# Patient Record
Sex: FEMALE | Race: WHITE | NOT HISPANIC OR LATINO
[De-identification: names, ages, dates, MRNs, and addresses within clinical notes are randomized per-mention and may not be internally consistent; named-entity substitution may affect disease eponyms.]

---

## 2017-01-25 ENCOUNTER — APPOINTMENT (OUTPATIENT)
Dept: INFECTIOUS DISEASE | Facility: CLINIC | Age: 57
End: 2017-01-25

## 2017-02-01 ENCOUNTER — APPOINTMENT (OUTPATIENT)
Dept: INFECTIOUS DISEASE | Facility: CLINIC | Age: 57
End: 2017-02-01

## 2017-02-01 ENCOUNTER — OUTPATIENT (OUTPATIENT)
Dept: OUTPATIENT SERVICES | Facility: HOSPITAL | Age: 57
LOS: 1 days | End: 2017-02-01

## 2017-03-08 ENCOUNTER — APPOINTMENT (OUTPATIENT)
Dept: INFECTIOUS DISEASE | Facility: CLINIC | Age: 57
End: 2017-03-08

## 2017-03-08 ENCOUNTER — OUTPATIENT (OUTPATIENT)
Dept: OUTPATIENT SERVICES | Facility: HOSPITAL | Age: 57
LOS: 1 days | End: 2017-03-08

## 2017-03-08 VITALS
RESPIRATION RATE: 14 BRPM | DIASTOLIC BLOOD PRESSURE: 80 MMHG | HEART RATE: 82 BPM | BODY MASS INDEX: 16.48 KG/M2 | OXYGEN SATURATION: 97 % | WEIGHT: 96 LBS | SYSTOLIC BLOOD PRESSURE: 124 MMHG

## 2017-03-08 DIAGNOSIS — J45.42 MODERATE PERSISTENT ASTHMA WITH STATUS ASTHMATICUS: ICD-10-CM

## 2017-03-08 DIAGNOSIS — Z21 ASYMPTOMATIC HUMAN IMMUNODEFICIENCY VIRUS [HIV] INFECTION STATUS: ICD-10-CM

## 2017-03-08 LAB
ALBUMIN SERPL ELPH-MCNC: 3.1 G/DL — LOW (ref 3.4–5)
ALP SERPL-CCNC: 131 U/L — HIGH (ref 40–120)
ALT FLD-CCNC: 18 U/L — SIGNIFICANT CHANGE UP (ref 12–42)
ANION GAP SERPL CALC-SCNC: 6 MMOL/L — LOW (ref 9–16)
AST SERPL-CCNC: 17 U/L — SIGNIFICANT CHANGE UP (ref 15–37)
BASOPHILS NFR BLD AUTO: 0.3 % — SIGNIFICANT CHANGE UP (ref 0–2)
BILIRUB SERPL-MCNC: 0.6 MG/DL — SIGNIFICANT CHANGE UP (ref 0.2–1.2)
BUN SERPL-MCNC: 17 MG/DL — SIGNIFICANT CHANGE UP (ref 7–23)
CALCIUM SERPL-MCNC: 8.1 MG/DL — LOW (ref 8.5–10.5)
CHLORIDE SERPL-SCNC: 107 MMOL/L — SIGNIFICANT CHANGE UP (ref 96–108)
CHOLEST SERPL-MCNC: 147 MG/DL — SIGNIFICANT CHANGE UP
CO2 SERPL-SCNC: 27 MMOL/L — SIGNIFICANT CHANGE UP (ref 22–31)
CREAT SERPL-MCNC: 1.11 MG/DL — SIGNIFICANT CHANGE UP (ref 0.5–1.3)
EOSINOPHIL NFR BLD AUTO: 1.2 % — SIGNIFICANT CHANGE UP (ref 0–6)
GLUCOSE SERPL-MCNC: 77 MG/DL — SIGNIFICANT CHANGE UP (ref 70–99)
HBA1C BLD-MCNC: 5.6 % — SIGNIFICANT CHANGE UP (ref 4.8–5.6)
HCT VFR BLD CALC: 35.4 % — SIGNIFICANT CHANGE UP (ref 34.5–45)
HCV AB S/CO SERPL IA: 13.37 S/CO — SIGNIFICANT CHANGE UP
HCV AB SERPL-IMP: REACTIVE
HDLC SERPL-MCNC: 32 MG/DL — LOW
HGB BLD-MCNC: 11.4 G/DL — LOW (ref 11.5–15.5)
LIPID PNL WITH DIRECT LDL SERPL: 96 MG/DL — SIGNIFICANT CHANGE UP
LYMPHOCYTES # BLD AUTO: 28 % — SIGNIFICANT CHANGE UP (ref 13–44)
MCHC RBC-ENTMCNC: 26.6 PG — LOW (ref 27–34)
MCHC RBC-ENTMCNC: 32.2 G/DL — SIGNIFICANT CHANGE UP (ref 32–36)
MCV RBC AUTO: 82.7 FL — SIGNIFICANT CHANGE UP (ref 80–100)
MONOCYTES NFR BLD AUTO: 6.3 % — SIGNIFICANT CHANGE UP (ref 2–14)
NEUTROPHILS NFR BLD AUTO: 64.2 % — SIGNIFICANT CHANGE UP (ref 43–77)
PLATELET # BLD AUTO: 102 K/UL — LOW (ref 150–400)
POTASSIUM SERPL-MCNC: 4.5 MMOL/L — SIGNIFICANT CHANGE UP (ref 3.5–5.3)
POTASSIUM SERPL-SCNC: 4.5 MMOL/L — SIGNIFICANT CHANGE UP (ref 3.5–5.3)
PROT SERPL-MCNC: 7.9 G/DL — SIGNIFICANT CHANGE UP (ref 6.4–8.2)
RBC # BLD: 4.28 M/UL — SIGNIFICANT CHANGE UP (ref 3.8–5.2)
RBC # FLD: 14.2 % — SIGNIFICANT CHANGE UP (ref 10.3–16.9)
SODIUM SERPL-SCNC: 140 MMOL/L — SIGNIFICANT CHANGE UP (ref 135–145)
TOTAL CHOLESTEROL/HDL RATIO MEASUREMENT: 4.6 RATIO — SIGNIFICANT CHANGE UP
TRIGL SERPL-MCNC: 96 MG/DL — SIGNIFICANT CHANGE UP
TSH SERPL-MCNC: 2.18 UIU/ML — SIGNIFICANT CHANGE UP (ref 0.35–4.94)
WBC # BLD: 3.3 K/UL — LOW (ref 3.8–10.5)
WBC # FLD AUTO: 3.3 K/UL — LOW (ref 3.8–10.5)

## 2017-03-09 LAB — T PALLIDUM AB TITR SER: NEGATIVE — SIGNIFICANT CHANGE UP

## 2017-03-10 LAB
4/8 RATIO: 0.13 RATIO — SIGNIFICANT CHANGE UP (ref 0.9–3.6)
ABS CD8: 856 /UL — HIGH (ref 136–757)
CD3 BLASTS SPEC-ACNC: 88 % — HIGH (ref 59–85)
CD3 BLASTS SPEC-ACNC: 971 /UL — SIGNIFICANT CHANGE UP (ref 799–2171)
CD4 %: 10 % — LOW (ref 36–65)
CD8 %: 78 % — HIGH (ref 11–36)
M TB TUBERC IFN-G BLD QL: -0.01 IU/ML — SIGNIFICANT CHANGE UP
M TB TUBERC IFN-G BLD QL: 0.1 IU/ML — SIGNIFICANT CHANGE UP
M TB TUBERC IFN-G BLD QL: NEGATIVE — SIGNIFICANT CHANGE UP
MITOGEN IGNF BCKGRD COR BLD-ACNC: >10 IU/ML — SIGNIFICANT CHANGE UP
T-CELL CD4 SUBSET PNL BLD: 107 /UL — LOW (ref 489–1457)

## 2017-03-11 LAB
HIV1 RNA # SERPL NAA+PROBE: SIGNIFICANT CHANGE UP
HIV1 RNA SERPL NAA+PROBE-LOG#: 5.02 LG10COP/ML — SIGNIFICANT CHANGE UP

## 2017-03-15 DIAGNOSIS — B20 HUMAN IMMUNODEFICIENCY VIRUS [HIV] DISEASE: ICD-10-CM

## 2017-03-15 DIAGNOSIS — R56.9 UNSPECIFIED CONVULSIONS: ICD-10-CM

## 2017-03-15 DIAGNOSIS — J45.42 MODERATE PERSISTENT ASTHMA WITH STATUS ASTHMATICUS: ICD-10-CM

## 2017-04-17 ENCOUNTER — MEDICATION RENEWAL (OUTPATIENT)
Age: 57
End: 2017-04-17

## 2017-07-10 ENCOUNTER — OUTPATIENT (OUTPATIENT)
Dept: OUTPATIENT SERVICES | Facility: HOSPITAL | Age: 57
LOS: 1 days | End: 2017-07-10

## 2017-07-10 ENCOUNTER — APPOINTMENT (OUTPATIENT)
Dept: INFECTIOUS DISEASE | Facility: CLINIC | Age: 57
End: 2017-07-10

## 2017-07-10 VITALS
OXYGEN SATURATION: 98 % | RESPIRATION RATE: 14 BRPM | HEART RATE: 61 BPM | BODY MASS INDEX: 17.07 KG/M2 | HEIGHT: 64 IN | WEIGHT: 100 LBS

## 2017-07-10 DIAGNOSIS — Z78.9 OTHER SPECIFIED HEALTH STATUS: ICD-10-CM

## 2017-07-10 DIAGNOSIS — Z87.891 PERSONAL HISTORY OF NICOTINE DEPENDENCE: ICD-10-CM

## 2017-07-10 DIAGNOSIS — F17.200 NICOTINE DEPENDENCE, UNSPECIFIED, UNCOMPLICATED: ICD-10-CM

## 2017-07-10 LAB
ALBUMIN SERPL ELPH-MCNC: 3.4 G/DL — SIGNIFICANT CHANGE UP (ref 3.3–5)
ALP SERPL-CCNC: 96 U/L — SIGNIFICANT CHANGE UP (ref 40–120)
ALT FLD-CCNC: 9 U/L — LOW (ref 10–45)
ANION GAP SERPL CALC-SCNC: 13 MMOL/L — SIGNIFICANT CHANGE UP (ref 5–17)
APPEARANCE UR: CLEAR — SIGNIFICANT CHANGE UP
AST SERPL-CCNC: 12 U/L — SIGNIFICANT CHANGE UP (ref 10–40)
BASOPHILS NFR BLD AUTO: 0.3 % — SIGNIFICANT CHANGE UP (ref 0–2)
BILIRUB SERPL-MCNC: 0.2 MG/DL — SIGNIFICANT CHANGE UP (ref 0.2–1.2)
BILIRUB UR-MCNC: NEGATIVE — SIGNIFICANT CHANGE UP
BUN SERPL-MCNC: 22 MG/DL — SIGNIFICANT CHANGE UP (ref 7–23)
CALCIUM SERPL-MCNC: 8.6 MG/DL — SIGNIFICANT CHANGE UP (ref 8.4–10.5)
CHLORIDE SERPL-SCNC: 101 MMOL/L — SIGNIFICANT CHANGE UP (ref 96–108)
CHOLEST SERPL-MCNC: 147 MG/DL — SIGNIFICANT CHANGE UP (ref 10–199)
CO2 SERPL-SCNC: 25 MMOL/L — SIGNIFICANT CHANGE UP (ref 22–31)
COLOR SPEC: YELLOW — SIGNIFICANT CHANGE UP
CREAT SERPL-MCNC: 1 MG/DL — SIGNIFICANT CHANGE UP (ref 0.5–1.3)
DIFF PNL FLD: NEGATIVE — SIGNIFICANT CHANGE UP
EOSINOPHIL NFR BLD AUTO: 2.5 % — SIGNIFICANT CHANGE UP (ref 0–6)
GLUCOSE SERPL-MCNC: 83 MG/DL — SIGNIFICANT CHANGE UP (ref 70–99)
GLUCOSE UR QL: NEGATIVE — SIGNIFICANT CHANGE UP
HCT VFR BLD CALC: 36 % — SIGNIFICANT CHANGE UP (ref 34.5–45)
HCV AB S/CO SERPL IA: 11.66 S/CO — SIGNIFICANT CHANGE UP
HCV AB SERPL-IMP: REACTIVE
HDLC SERPL-MCNC: 30 MG/DL — LOW (ref 40–125)
HGB BLD-MCNC: 11.5 G/DL — SIGNIFICANT CHANGE UP (ref 11.5–15.5)
KETONES UR-MCNC: NEGATIVE — SIGNIFICANT CHANGE UP
LEUKOCYTE ESTERASE UR-ACNC: NEGATIVE — SIGNIFICANT CHANGE UP
LIPID PNL WITH DIRECT LDL SERPL: 84 MG/DL — SIGNIFICANT CHANGE UP
LYMPHOCYTES # BLD AUTO: 30.2 % — SIGNIFICANT CHANGE UP (ref 13–44)
MCHC RBC-ENTMCNC: 26 PG — LOW (ref 27–34)
MCHC RBC-ENTMCNC: 31.9 G/DL — LOW (ref 32–36)
MCV RBC AUTO: 81.3 FL — SIGNIFICANT CHANGE UP (ref 80–100)
MONOCYTES NFR BLD AUTO: 8 % — SIGNIFICANT CHANGE UP (ref 2–14)
NEUTROPHILS NFR BLD AUTO: 59.3 % — SIGNIFICANT CHANGE UP (ref 43–77)
NITRITE UR-MCNC: NEGATIVE — SIGNIFICANT CHANGE UP
PCP SPEC-MCNC: SIGNIFICANT CHANGE UP
PH UR: 6 — SIGNIFICANT CHANGE UP (ref 5–8)
PLATELET # BLD AUTO: 114 K/UL — LOW (ref 150–400)
POTASSIUM SERPL-MCNC: 4.2 MMOL/L — SIGNIFICANT CHANGE UP (ref 3.5–5.3)
POTASSIUM SERPL-SCNC: 4.2 MMOL/L — SIGNIFICANT CHANGE UP (ref 3.5–5.3)
PROT SERPL-MCNC: 7.6 G/DL — SIGNIFICANT CHANGE UP (ref 6–8.3)
PROT UR-MCNC: 30 MG/DL
RBC # BLD: 4.43 M/UL — SIGNIFICANT CHANGE UP (ref 3.8–5.2)
RBC # FLD: 15.7 % — SIGNIFICANT CHANGE UP (ref 10.3–16.9)
SODIUM SERPL-SCNC: 139 MMOL/L — SIGNIFICANT CHANGE UP (ref 135–145)
SP GR SPEC: 1.02 — SIGNIFICANT CHANGE UP (ref 1–1.03)
TOTAL CHOLESTEROL/HDL RATIO MEASUREMENT: 4.9 RATIO — SIGNIFICANT CHANGE UP (ref 3.3–7.1)
TRIGL SERPL-MCNC: 163 MG/DL — HIGH (ref 10–149)
UROBILINOGEN FLD QL: 1 E.U./DL — SIGNIFICANT CHANGE UP
WBC # BLD: 3.2 K/UL — LOW (ref 3.8–10.5)
WBC # FLD AUTO: 3.2 K/UL — LOW (ref 3.8–10.5)

## 2017-07-11 ENCOUNTER — OUTPATIENT (OUTPATIENT)
Dept: OUTPATIENT SERVICES | Facility: HOSPITAL | Age: 57
LOS: 1 days | End: 2017-07-11
Payer: MEDICARE

## 2017-07-11 PROCEDURE — 49406 IMAGE CATH FLUID PERI/RETRO: CPT

## 2017-07-11 PROCEDURE — 62323 NJX INTERLAMINAR LMBR/SAC: CPT

## 2017-07-11 RX ORDER — ALPRAZOLAM 2 MG/1
2 TABLET ORAL
Qty: 90 | Refills: 0 | Status: DISCONTINUED | COMMUNITY
Start: 2016-10-18 | End: 2017-07-11

## 2017-07-11 RX ORDER — OXYCODONE 20 MG/1
20 TABLET ORAL
Qty: 150 | Refills: 0 | Status: DISCONTINUED | COMMUNITY
Start: 2016-11-15 | End: 2017-07-11

## 2017-07-11 RX ORDER — PREGABALIN 25 MG/1
25 CAPSULE ORAL
Qty: 60 | Refills: 0 | Status: DISCONTINUED | COMMUNITY
Start: 2016-10-07 | End: 2017-07-11

## 2017-07-11 RX ORDER — PREGABALIN 50 MG/1
50 CAPSULE ORAL
Qty: 60 | Refills: 0 | Status: DISCONTINUED | COMMUNITY
Start: 2016-11-15 | End: 2017-07-11

## 2017-07-11 RX ORDER — OXYCODONE HYDROCHLORIDE 30 MG/1
30 TABLET ORAL
Qty: 120 | Refills: 0 | Status: DISCONTINUED | COMMUNITY
Start: 2017-01-10 | End: 2017-07-11

## 2017-07-11 RX ORDER — GABAPENTIN 800 MG/1
800 TABLET, COATED ORAL
Qty: 90 | Refills: 0 | Status: DISCONTINUED | COMMUNITY
Start: 2016-09-20 | End: 2017-07-11

## 2017-07-11 RX ORDER — OXYCODONE HYDROCHLORIDE 40 MG/1
40 TABLET, FILM COATED, EXTENDED RELEASE ORAL
Qty: 60 | Refills: 0 | Status: DISCONTINUED | COMMUNITY
Start: 2016-11-15 | End: 2017-07-11

## 2017-07-11 RX ORDER — SULFAMETHOXAZOLE AND TRIMETHOPRIM 800; 160 MG/1; MG/1
800-160 TABLET ORAL
Qty: 90 | Refills: 0 | Status: DISCONTINUED | COMMUNITY
Start: 2016-11-20 | End: 2017-07-11

## 2017-07-12 LAB
4/8 RATIO: 0.1 RATIO — LOW (ref 0.9–3.6)
ABS CD8: 785 /UL — HIGH (ref 136–757)
C TRACH RRNA SPEC QL NAA+PROBE: SIGNIFICANT CHANGE UP
CD3 BLASTS SPEC-ACNC: 876 /UL — SIGNIFICANT CHANGE UP (ref 799–2171)
CD3 BLASTS SPEC-ACNC: 91 % — HIGH (ref 59–85)
CD4 %: 8 % — LOW (ref 36–65)
CD8 %: 81 % — HIGH (ref 11–36)
M TB TUBERC IFN-G BLD QL: 0 IU/ML — SIGNIFICANT CHANGE UP
M TB TUBERC IFN-G BLD QL: 0.03 IU/ML — SIGNIFICANT CHANGE UP
M TB TUBERC IFN-G BLD QL: NEGATIVE — SIGNIFICANT CHANGE UP
MITOGEN IGNF BCKGRD COR BLD-ACNC: 2.12 IU/ML — SIGNIFICANT CHANGE UP
N GONORRHOEA RRNA SPEC QL NAA+PROBE: SIGNIFICANT CHANGE UP
SPECIMEN SOURCE: SIGNIFICANT CHANGE UP
T PALLIDUM AB TITR SER: NEGATIVE — SIGNIFICANT CHANGE UP
T-CELL CD4 SUBSET PNL BLD: 80 /UL — LOW (ref 489–1457)

## 2017-07-13 LAB
HIV-1 VIRAL LOAD RESULT: (no result)
HIV1 RNA # SERPL NAA+PROBE: SIGNIFICANT CHANGE UP
HIV1 RNA SER-IMP: SIGNIFICANT CHANGE UP
HIV1 RNA SERPL NAA+PROBE-ACNC: (no result)
HIV1 RNA SERPL NAA+PROBE-LOG#: 4.87 LG COP/ML — SIGNIFICANT CHANGE UP

## 2017-07-19 DIAGNOSIS — B20 HUMAN IMMUNODEFICIENCY VIRUS [HIV] DISEASE: ICD-10-CM

## 2017-07-19 DIAGNOSIS — J45.909 UNSPECIFIED ASTHMA, UNCOMPLICATED: ICD-10-CM

## 2017-07-19 DIAGNOSIS — R10.13 EPIGASTRIC PAIN: ICD-10-CM

## 2017-07-19 DIAGNOSIS — B19.20 UNSPECIFIED VIRAL HEPATITIS C WITHOUT HEPATIC COMA: ICD-10-CM

## 2017-07-19 DIAGNOSIS — J44.9 CHRONIC OBSTRUCTIVE PULMONARY DISEASE, UNSPECIFIED: ICD-10-CM

## 2017-11-09 ENCOUNTER — EMERGENCY (EMERGENCY)
Facility: HOSPITAL | Age: 57
LOS: 1 days | Discharge: HOME | End: 2017-11-09
Admitting: EMERGENCY MEDICINE
Payer: MEDICARE

## 2017-11-09 VITALS
DIASTOLIC BLOOD PRESSURE: 68 MMHG | OXYGEN SATURATION: 97 % | RESPIRATION RATE: 16 BRPM | TEMPERATURE: 98 F | HEART RATE: 97 BPM | SYSTOLIC BLOOD PRESSURE: 102 MMHG

## 2017-11-09 VITALS
HEART RATE: 103 BPM | RESPIRATION RATE: 17 BRPM | TEMPERATURE: 98 F | OXYGEN SATURATION: 95 % | DIASTOLIC BLOOD PRESSURE: 75 MMHG | SYSTOLIC BLOOD PRESSURE: 106 MMHG

## 2017-11-09 PROCEDURE — 70486 CT MAXILLOFACIAL W/O DYE: CPT | Mod: 26

## 2017-11-09 PROCEDURE — 70450 CT HEAD/BRAIN W/O DYE: CPT | Mod: 26

## 2017-11-09 PROCEDURE — 99285 EMERGENCY DEPT VISIT HI MDM: CPT

## 2017-11-09 PROCEDURE — 73080 X-RAY EXAM OF ELBOW: CPT | Mod: 26,LT

## 2017-11-09 RX ORDER — LEVETIRACETAM 250 MG/1
500 TABLET, FILM COATED ORAL ONCE
Qty: 0 | Refills: 0 | Status: COMPLETED | OUTPATIENT
Start: 2017-11-09 | End: 2017-11-09

## 2017-11-09 RX ORDER — TETANUS TOXOID, REDUCED DIPHTHERIA TOXOID AND ACELLULAR PERTUSSIS VACCINE, ADSORBED 5; 2.5; 8; 8; 2.5 [IU]/.5ML; [IU]/.5ML; UG/.5ML; UG/.5ML; UG/.5ML
0.5 SUSPENSION INTRAMUSCULAR ONCE
Qty: 0 | Refills: 0 | Status: COMPLETED | OUTPATIENT
Start: 2017-11-09 | End: 2017-11-09

## 2017-11-09 RX ORDER — ACETAMINOPHEN 500 MG
650 TABLET ORAL ONCE
Qty: 0 | Refills: 0 | Status: COMPLETED | OUTPATIENT
Start: 2017-11-09 | End: 2017-11-09

## 2017-11-09 RX ADMIN — Medication 650 MILLIGRAM(S): at 14:27

## 2017-11-09 RX ADMIN — TETANUS TOXOID, REDUCED DIPHTHERIA TOXOID AND ACELLULAR PERTUSSIS VACCINE, ADSORBED 0.5 MILLILITER(S): 5; 2.5; 8; 8; 2.5 SUSPENSION INTRAMUSCULAR at 13:04

## 2017-11-09 RX ADMIN — Medication 1 TABLET(S): at 15:56

## 2017-11-09 NOTE — ED ADULT NURSE NOTE - OBJECTIVE STATEMENT
Patient is a 56 y/o female presenting to the ED to be further evaluated after having seizure yesterday after missing keppra dose. Patient would like scan of head. Patient has multiple abrasions to face with ecchymosis. Patient is A,A&Ox3, resting comfortably and will continue to monitor.

## 2017-11-09 NOTE — ED PROVIDER NOTE - HEAD, MLM
Head shape is symmetrical. Swelling and periorbital ecchymosis on right eye. Small abrasion 1cm above right eyebrow. No hematoma over the head.

## 2017-11-09 NOTE — SBIRT NOTE. - NSSBIRTFULLSCREEN_GEN_A_ED_FT
Pt has history of opiates use and is currently on a methadone program. Pt has been trained and Narcan kit was provided.

## 2017-11-09 NOTE — ED PROVIDER NOTE - MUSCULOSKELETAL, MLM
Spine appears normal, range of motion is not limited, no muscle or joint tenderness, no C-spine tenderness, no rib tenderness, Left Elbow: No deformity, no tenderness, FROM, no edema.

## 2017-11-09 NOTE — ED PROVIDER NOTE - MEDICAL DECISION MAKING DETAILS
Pt with history of seizures, on Keppra. Had breakthrough seizure yesterday. Head CT, maxillofacial CT, finger stick, and Tetanus. No clinical indication for labs. Pt has good follow up. Will re-assess. Pt with history of seizures, on Keppra. Had breakthrough seizure yesterday. Head CT, maxillofacial CT, finger stick, and Tetanus. No clinical indication for labs. Pt has good follow up. Will re-assess. pt given strict ED precaution if she develops n/v, and eye drooping. Referral and information given for ENT.

## 2017-11-09 NOTE — ED PROVIDER NOTE - ENMT, MLM
Airway patent, Mouth with normal mucosa. Throat has no vesicles, no oropharyngeal exudates and uvula is midline. Swelling over upper lip and dried blood, dentures, no tongue laceration

## 2017-11-09 NOTE — ED ADULT NURSE NOTE - CHPI ED SYMPTOMS NEG
no weakness/no confusion/no vomiting/no numbness/no loss of consciousness/no nausea/no change in level of consciousness/no dizziness/no fever/no blurred vision

## 2017-11-09 NOTE — ED BEHAVIORAL HEALTH NOTE - BEHAVIORAL HEALTH NOTE
Sw was consulted to the ED to arrange transportation for the patient. Patient is a 57 year old single female who currently lives in a supporting housing community. Transportation was arranged through [employer] ( [facility] [employer] [facility]-[id number] as the patient has medicaid and is covered for non emergency transportation home. Patient is agreeable to transportation home. The vendor that [facility] is providing is [employer] ([phone]) with a  time between 5:15-5:45pm and an invoice number is [id number]. [employer] was contacted but they declined to take the trip. Patient is agreeable to  time and team has been notified on discharge arrangements. Worker made available for any further assistance needed.

## 2017-11-09 NOTE — ED PROVIDER NOTE - OBJECTIVE STATEMENT
58 yo F with a hx of seizures on Keppra 800mg daily, AIDs, on methadone states had a seizure yesterday at 1pm. Last seizure was 1 yr ago. Pt states she is compliant with medication. Came in as per recommendation from EMS because of facial swelling s/p fall. Also notes left elbow pain which may have been due to trauma from seizure. Pt currently denies any HA, N/V, generalized/focal weakness, fever, chills, CP and SOB.

## 2017-11-09 NOTE — ED PROVIDER NOTE - PMH
Asymptomatic human immunodeficiency virus infection  HIV (human immunodeficiency virus infection)  Chronic obstructive pulmonary disease  COPD (chronic obstructive pulmonary disease)  Epilepsy  Seizure disorder  Lumbar disc herniation with radiculopathy    Mixed, or nondependent drug abuse  IV drug abuse  Pneumonia due to methicillin resistant Staphylococcus aureus  MRSA pneumonia  Sciatica

## 2017-11-09 NOTE — ED PROVIDER NOTE - NEUROLOGICAL, MLM
Alert and oriented, no focal deficits, no motor or sensory deficits. Follows commands appropriately. Moves all extremities x4, Strength 5/5 in all extremities.

## 2017-11-10 ENCOUNTER — APPOINTMENT (OUTPATIENT)
Dept: OTOLARYNGOLOGY | Facility: CLINIC | Age: 57
End: 2017-11-10

## 2017-11-13 DIAGNOSIS — M25.422 EFFUSION, LEFT ELBOW: ICD-10-CM
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
## 2017-11-13 DIAGNOSIS — G40.909 EPILEPSY, UNSPECIFIED, NOT INTRACTABLE, WITHOUT STATUS EPILEPTICUS: ICD-10-CM

## 2017-11-13 DIAGNOSIS — Z23 ENCOUNTER FOR IMMUNIZATION: ICD-10-CM

## 2017-11-13 DIAGNOSIS — J44.9 CHRONIC OBSTRUCTIVE PULMONARY DISEASE, UNSPECIFIED: ICD-10-CM
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
## 2017-11-13 DIAGNOSIS — Z79.899 OTHER LONG TERM (CURRENT) DRUG THERAPY: ICD-10-CM
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
## 2017-11-13 DIAGNOSIS — B20 HUMAN IMMUNODEFICIENCY VIRUS [HIV] DISEASE: ICD-10-CM

## 2017-11-13 DIAGNOSIS — S02.80XA FRACTURE OF OTHER SPECIFIED SKULL AND FACIAL BONES, UNSPECIFIED SIDE, INITIAL ENCOUNTER FOR CLOSED FRACTURE: ICD-10-CM

## 2017-11-29 ENCOUNTER — OUTPATIENT (OUTPATIENT)
Dept: OUTPATIENT SERVICES | Facility: HOSPITAL | Age: 57
LOS: 1 days | End: 2017-11-29

## 2017-11-29 ENCOUNTER — APPOINTMENT (OUTPATIENT)
Dept: INFECTIOUS DISEASE | Facility: CLINIC | Age: 57
End: 2017-11-29

## 2017-11-29 VITALS
OXYGEN SATURATION: 97 % | RESPIRATION RATE: 14 BRPM | HEIGHT: 64 IN | HEART RATE: 80 BPM | DIASTOLIC BLOOD PRESSURE: 75 MMHG | SYSTOLIC BLOOD PRESSURE: 115 MMHG | BODY MASS INDEX: 14 KG/M2 | WEIGHT: 82 LBS

## 2017-11-29 DIAGNOSIS — R10.13 EPIGASTRIC PAIN: ICD-10-CM

## 2017-12-11 DIAGNOSIS — R56.9 UNSPECIFIED CONVULSIONS: ICD-10-CM

## 2017-12-11 DIAGNOSIS — B20 HUMAN IMMUNODEFICIENCY VIRUS [HIV] DISEASE: ICD-10-CM

## 2017-12-11 DIAGNOSIS — R10.13 EPIGASTRIC PAIN: ICD-10-CM

## 2017-12-11 DIAGNOSIS — J45.909 UNSPECIFIED ASTHMA, UNCOMPLICATED: ICD-10-CM

## 2017-12-11 DIAGNOSIS — B19.20 UNSPECIFIED VIRAL HEPATITIS C WITHOUT HEPATIC COMA: ICD-10-CM

## 2017-12-21 VITALS
SYSTOLIC BLOOD PRESSURE: 137 MMHG | HEART RATE: 95 BPM | RESPIRATION RATE: 22 BRPM | TEMPERATURE: 98 F | WEIGHT: 72.75 LBS | OXYGEN SATURATION: 95 % | HEIGHT: 64 IN | DIASTOLIC BLOOD PRESSURE: 88 MMHG

## 2017-12-21 PROCEDURE — 71010: CPT | Mod: 26

## 2017-12-21 PROCEDURE — 99285 EMERGENCY DEPT VISIT HI MDM: CPT | Mod: 25

## 2017-12-21 PROCEDURE — 93010 ELECTROCARDIOGRAM REPORT: CPT

## 2017-12-21 NOTE — ED ADULT NURSE NOTE - CHPI ED SYMPTOMS NEG
no tingling/no chills/no dizziness/no weakness/no decreased eating/drinking/no vomiting/no fever/no nausea/no numbness

## 2017-12-21 NOTE — ED ADULT TRIAGE NOTE - CHIEF COMPLAINT QUOTE
Pt with known Hx of HIV x 22 years, presents with c/o SOB, 30 POUND WT LOSS X 1 MONTH. Pt noted for cachexia, wt at triage 33 KG (fully clothed). Pt admits to using street Methadone.

## 2017-12-22 ENCOUNTER — RESULT REVIEW (OUTPATIENT)
Age: 57
End: 2017-12-22

## 2017-12-22 ENCOUNTER — INPATIENT (INPATIENT)
Facility: HOSPITAL | Age: 57
LOS: 3 days | Discharge: HOME | DRG: 391 | End: 2017-12-26
Attending: STUDENT IN AN ORGANIZED HEALTH CARE EDUCATION/TRAINING PROGRAM | Admitting: STUDENT IN AN ORGANIZED HEALTH CARE EDUCATION/TRAINING PROGRAM
Payer: MEDICARE

## 2017-12-22 DIAGNOSIS — B20 HUMAN IMMUNODEFICIENCY VIRUS [HIV] DISEASE: ICD-10-CM

## 2017-12-22 DIAGNOSIS — J44.9 CHRONIC OBSTRUCTIVE PULMONARY DISEASE, UNSPECIFIED: ICD-10-CM

## 2017-12-22 DIAGNOSIS — N17.9 ACUTE KIDNEY FAILURE, UNSPECIFIED: ICD-10-CM

## 2017-12-22 DIAGNOSIS — B19.20 UNSPECIFIED VIRAL HEPATITIS C WITHOUT HEPATIC COMA: ICD-10-CM

## 2017-12-22 DIAGNOSIS — R13.10 DYSPHAGIA, UNSPECIFIED: ICD-10-CM

## 2017-12-22 DIAGNOSIS — B18.2 CHRONIC VIRAL HEPATITIS C: ICD-10-CM

## 2017-12-22 DIAGNOSIS — Z29.9 ENCOUNTER FOR PROPHYLACTIC MEASURES, UNSPECIFIED: ICD-10-CM

## 2017-12-22 DIAGNOSIS — R63.4 ABNORMAL WEIGHT LOSS: ICD-10-CM

## 2017-12-22 DIAGNOSIS — F19.10 OTHER PSYCHOACTIVE SUBSTANCE ABUSE, UNCOMPLICATED: ICD-10-CM

## 2017-12-22 DIAGNOSIS — D61.818 OTHER PANCYTOPENIA: ICD-10-CM

## 2017-12-22 DIAGNOSIS — G40.909 EPILEPSY, UNSPECIFIED, NOT INTRACTABLE, WITHOUT STATUS EPILEPTICUS: ICD-10-CM

## 2017-12-22 LAB
ALBUMIN SERPL ELPH-MCNC: 3.3 G/DL — SIGNIFICANT CHANGE UP (ref 3.3–5)
ALP SERPL-CCNC: 73 U/L — SIGNIFICANT CHANGE UP (ref 40–120)
ALT FLD-CCNC: 10 U/L — SIGNIFICANT CHANGE UP (ref 10–45)
ANION GAP SERPL CALC-SCNC: 11 MMOL/L — SIGNIFICANT CHANGE UP (ref 5–17)
ANION GAP SERPL CALC-SCNC: 15 MMOL/L — SIGNIFICANT CHANGE UP (ref 5–17)
AST SERPL-CCNC: 16 U/L — SIGNIFICANT CHANGE UP (ref 10–40)
BASOPHILS NFR BLD AUTO: 0.3 % — SIGNIFICANT CHANGE UP (ref 0–2)
BASOPHILS NFR BLD AUTO: 0.4 % — SIGNIFICANT CHANGE UP (ref 0–2)
BILIRUB SERPL-MCNC: 0.3 MG/DL — SIGNIFICANT CHANGE UP (ref 0.2–1.2)
BUN SERPL-MCNC: 30 MG/DL — HIGH (ref 7–23)
BUN SERPL-MCNC: 31 MG/DL — HIGH (ref 7–23)
CALCIUM SERPL-MCNC: 8 MG/DL — LOW (ref 8.4–10.5)
CALCIUM SERPL-MCNC: 8.8 MG/DL — SIGNIFICANT CHANGE UP (ref 8.4–10.5)
CHLORIDE SERPL-SCNC: 102 MMOL/L — SIGNIFICANT CHANGE UP (ref 96–108)
CHLORIDE SERPL-SCNC: 104 MMOL/L — SIGNIFICANT CHANGE UP (ref 96–108)
CK MB CFR SERPL CALC: 4.1 NG/ML — SIGNIFICANT CHANGE UP (ref 0–6.7)
CK SERPL-CCNC: 98 U/L — SIGNIFICANT CHANGE UP (ref 25–170)
CO2 SERPL-SCNC: 23 MMOL/L — SIGNIFICANT CHANGE UP (ref 22–31)
CO2 SERPL-SCNC: 23 MMOL/L — SIGNIFICANT CHANGE UP (ref 22–31)
CREAT SERPL-MCNC: 1.12 MG/DL — SIGNIFICANT CHANGE UP (ref 0.5–1.3)
CREAT SERPL-MCNC: 1.19 MG/DL — SIGNIFICANT CHANGE UP (ref 0.5–1.3)
EOSINOPHIL NFR BLD AUTO: 0.6 % — SIGNIFICANT CHANGE UP (ref 0–6)
EOSINOPHIL NFR BLD AUTO: 1.5 % — SIGNIFICANT CHANGE UP (ref 0–6)
GLUCOSE SERPL-MCNC: 115 MG/DL — HIGH (ref 70–99)
GLUCOSE SERPL-MCNC: 88 MG/DL — SIGNIFICANT CHANGE UP (ref 70–99)
HCT VFR BLD CALC: 29.5 % — LOW (ref 34.5–45)
HCT VFR BLD CALC: 31.5 % — LOW (ref 34.5–45)
HGB BLD-MCNC: 10.3 G/DL — LOW (ref 11.5–15.5)
HGB BLD-MCNC: 9.4 G/DL — LOW (ref 11.5–15.5)
LYMPHOCYTES # BLD AUTO: 24.6 % — SIGNIFICANT CHANGE UP (ref 13–44)
LYMPHOCYTES # BLD AUTO: 25 % — SIGNIFICANT CHANGE UP (ref 13–44)
MAGNESIUM SERPL-MCNC: 1.9 MG/DL — SIGNIFICANT CHANGE UP (ref 1.6–2.6)
MCHC RBC-ENTMCNC: 25.9 PG — LOW (ref 27–34)
MCHC RBC-ENTMCNC: 26 PG — LOW (ref 27–34)
MCHC RBC-ENTMCNC: 31.9 G/DL — LOW (ref 32–36)
MCHC RBC-ENTMCNC: 32.7 G/DL — SIGNIFICANT CHANGE UP (ref 32–36)
MCV RBC AUTO: 79.1 FL — LOW (ref 80–100)
MCV RBC AUTO: 81.5 FL — SIGNIFICANT CHANGE UP (ref 80–100)
MONOCYTES NFR BLD AUTO: 5 % — SIGNIFICANT CHANGE UP (ref 2–14)
MONOCYTES NFR BLD AUTO: 5.5 % — SIGNIFICANT CHANGE UP (ref 2–14)
NEUTROPHILS NFR BLD AUTO: 67.6 % — SIGNIFICANT CHANGE UP (ref 43–77)
NEUTROPHILS NFR BLD AUTO: 69.5 % — SIGNIFICANT CHANGE UP (ref 43–77)
NT-PROBNP SERPL-SCNC: 385 PG/ML — HIGH (ref 0–300)
PHOSPHATE SERPL-MCNC: 3.1 MG/DL — SIGNIFICANT CHANGE UP (ref 2.5–4.5)
PLATELET # BLD AUTO: 126 K/UL — LOW (ref 150–400)
PLATELET # BLD AUTO: 139 K/UL — LOW (ref 150–400)
POTASSIUM SERPL-MCNC: 3.7 MMOL/L — SIGNIFICANT CHANGE UP (ref 3.5–5.3)
POTASSIUM SERPL-MCNC: 3.8 MMOL/L — SIGNIFICANT CHANGE UP (ref 3.5–5.3)
POTASSIUM SERPL-SCNC: 3.7 MMOL/L — SIGNIFICANT CHANGE UP (ref 3.5–5.3)
POTASSIUM SERPL-SCNC: 3.8 MMOL/L — SIGNIFICANT CHANGE UP (ref 3.5–5.3)
PROT SERPL-MCNC: 7.7 G/DL — SIGNIFICANT CHANGE UP (ref 6–8.3)
RBC # BLD: 3.62 M/UL — LOW (ref 3.8–5.2)
RBC # BLD: 3.98 M/UL — SIGNIFICANT CHANGE UP (ref 3.8–5.2)
RBC # FLD: 14 % — SIGNIFICANT CHANGE UP (ref 10.3–16.9)
RBC # FLD: 14.1 % — SIGNIFICANT CHANGE UP (ref 10.3–16.9)
SODIUM SERPL-SCNC: 138 MMOL/L — SIGNIFICANT CHANGE UP (ref 135–145)
SODIUM SERPL-SCNC: 140 MMOL/L — SIGNIFICANT CHANGE UP (ref 135–145)
TROPONIN T SERPL-MCNC: <0.01 NG/ML — SIGNIFICANT CHANGE UP (ref 0–0.01)
TSH SERPL-MCNC: 0.46 UIU/ML — SIGNIFICANT CHANGE UP (ref 0.35–4.94)
WBC # BLD: 2.7 K/UL — LOW (ref 3.8–10.5)
WBC # BLD: 3.4 K/UL — LOW (ref 3.8–10.5)
WBC # FLD AUTO: 2.7 K/UL — LOW (ref 3.8–10.5)
WBC # FLD AUTO: 3.4 K/UL — LOW (ref 3.8–10.5)

## 2017-12-22 PROCEDURE — 99223 1ST HOSP IP/OBS HIGH 75: CPT | Mod: GC

## 2017-12-22 PROCEDURE — 71010: CPT | Mod: 26

## 2017-12-22 RX ORDER — EMTRICITABINE AND TENOFOVIR DISOPROXIL FUMARATE 200; 300 MG/1; MG/1
1 TABLET, FILM COATED ORAL
Qty: 0 | Refills: 0 | Status: DISCONTINUED | OUTPATIENT
Start: 2017-12-24 | End: 2017-12-26

## 2017-12-22 RX ORDER — PANTOPRAZOLE SODIUM 20 MG/1
40 TABLET, DELAYED RELEASE ORAL DAILY
Qty: 0 | Refills: 0 | Status: DISCONTINUED | OUTPATIENT
Start: 2017-12-22 | End: 2017-12-25

## 2017-12-22 RX ORDER — MAGNESIUM SULFATE 500 MG/ML
1 VIAL (ML) INJECTION ONCE
Qty: 0 | Refills: 0 | Status: COMPLETED | OUTPATIENT
Start: 2017-12-22 | End: 2017-12-22

## 2017-12-22 RX ORDER — FLUCONAZOLE 150 MG/1
100 TABLET ORAL DAILY
Qty: 0 | Refills: 0 | Status: DISCONTINUED | OUTPATIENT
Start: 2017-12-23 | End: 2017-12-23

## 2017-12-22 RX ORDER — BUDESONIDE AND FORMOTEROL FUMARATE DIHYDRATE 160; 4.5 UG/1; UG/1
2 AEROSOL RESPIRATORY (INHALATION)
Qty: 0 | Refills: 0 | Status: DISCONTINUED | OUTPATIENT
Start: 2017-12-22 | End: 2017-12-26

## 2017-12-22 RX ORDER — HEPARIN SODIUM 5000 [USP'U]/ML
5000 INJECTION INTRAVENOUS; SUBCUTANEOUS EVERY 12 HOURS
Qty: 0 | Refills: 0 | Status: DISCONTINUED | OUTPATIENT
Start: 2017-12-22 | End: 2017-12-26

## 2017-12-22 RX ORDER — EMTRICITABINE AND TENOFOVIR DISOPROXIL FUMARATE 200; 300 MG/1; MG/1
1 TABLET, FILM COATED ORAL DAILY
Qty: 0 | Refills: 0 | Status: DISCONTINUED | OUTPATIENT
Start: 2017-12-22 | End: 2017-12-22

## 2017-12-22 RX ORDER — RITONAVIR 100 MG/1
100 TABLET, FILM COATED ORAL DAILY
Qty: 0 | Refills: 0 | Status: DISCONTINUED | OUTPATIENT
Start: 2017-12-22 | End: 2017-12-23

## 2017-12-22 RX ORDER — ACETAMINOPHEN 500 MG
500 TABLET ORAL EVERY 6 HOURS
Qty: 0 | Refills: 0 | Status: DISCONTINUED | OUTPATIENT
Start: 2017-12-22 | End: 2017-12-23

## 2017-12-22 RX ORDER — SODIUM CHLORIDE 9 MG/ML
1000 INJECTION, SOLUTION INTRAVENOUS
Qty: 0 | Refills: 0 | Status: DISCONTINUED | OUTPATIENT
Start: 2017-12-22 | End: 2017-12-26

## 2017-12-22 RX ORDER — HYDROMORPHONE HYDROCHLORIDE 2 MG/ML
0.5 INJECTION INTRAMUSCULAR; INTRAVENOUS; SUBCUTANEOUS
Qty: 0 | Refills: 0 | Status: DISCONTINUED | OUTPATIENT
Start: 2017-12-22 | End: 2017-12-26

## 2017-12-22 RX ORDER — FLUTICASONE PROPIONATE AND SALMETEROL 50; 250 UG/1; UG/1
2 POWDER ORAL; RESPIRATORY (INHALATION)
Qty: 0 | Refills: 0 | COMMUNITY

## 2017-12-22 RX ORDER — ALBUTEROL 90 UG/1
2 AEROSOL, METERED ORAL
Qty: 0 | Refills: 0 | COMMUNITY

## 2017-12-22 RX ORDER — PANTOPRAZOLE SODIUM 20 MG/1
40 TABLET, DELAYED RELEASE ORAL ONCE
Qty: 0 | Refills: 0 | Status: COMPLETED | OUTPATIENT
Start: 2017-12-22 | End: 2017-12-22

## 2017-12-22 RX ORDER — ACETAMINOPHEN 500 MG
750 TABLET ORAL EVERY 8 HOURS
Qty: 0 | Refills: 0 | Status: DISCONTINUED | OUTPATIENT
Start: 2017-12-22 | End: 2017-12-22

## 2017-12-22 RX ORDER — BENZOCAINE AND MENTHOL 5; 1 G/100ML; G/100ML
1 LIQUID ORAL
Qty: 0 | Refills: 0 | Status: DISCONTINUED | OUTPATIENT
Start: 2017-12-22 | End: 2017-12-23

## 2017-12-22 RX ORDER — ATAZANAVIR 200 MG/1
300 CAPSULE ORAL DAILY
Qty: 0 | Refills: 0 | Status: DISCONTINUED | OUTPATIENT
Start: 2017-12-22 | End: 2017-12-26

## 2017-12-22 RX ORDER — GANCICLOVIR SODIUM 50 MG/ML
80 INJECTION, POWDER, LYOPHILIZED, FOR SOLUTION INTRAVENTRICULAR EVERY 24 HOURS
Qty: 0 | Refills: 0 | Status: DISCONTINUED | OUTPATIENT
Start: 2017-12-23 | End: 2017-12-23

## 2017-12-22 RX ORDER — GANCICLOVIR SODIUM 50 MG/ML
INJECTION, POWDER, LYOPHILIZED, FOR SOLUTION INTRAVENTRICULAR
Qty: 0 | Refills: 0 | Status: DISCONTINUED | OUTPATIENT
Start: 2017-12-22 | End: 2017-12-23

## 2017-12-22 RX ORDER — ALBUTEROL 90 UG/1
1 AEROSOL, METERED ORAL
Qty: 0 | Refills: 0 | Status: DISCONTINUED | OUTPATIENT
Start: 2017-12-22 | End: 2017-12-26

## 2017-12-22 RX ORDER — AZITHROMYCIN 500 MG/1
500 TABLET, FILM COATED ORAL ONCE
Qty: 0 | Refills: 0 | Status: COMPLETED | OUTPATIENT
Start: 2017-12-22 | End: 2017-12-22

## 2017-12-22 RX ORDER — GANCICLOVIR SODIUM 50 MG/ML
80 INJECTION, POWDER, LYOPHILIZED, FOR SOLUTION INTRAVENTRICULAR ONCE
Qty: 0 | Refills: 0 | Status: COMPLETED | OUTPATIENT
Start: 2017-12-22 | End: 2017-12-22

## 2017-12-22 RX ORDER — LEVETIRACETAM 250 MG/1
1 TABLET, FILM COATED ORAL
Qty: 0 | Refills: 0 | COMMUNITY

## 2017-12-22 RX ORDER — ACETAMINOPHEN 500 MG
500 TABLET ORAL ONCE
Qty: 0 | Refills: 0 | Status: DISCONTINUED | OUTPATIENT
Start: 2017-12-22 | End: 2017-12-22

## 2017-12-22 RX ORDER — FAMOTIDINE 10 MG/ML
20 INJECTION INTRAVENOUS
Qty: 0 | Refills: 0 | Status: DISCONTINUED | OUTPATIENT
Start: 2017-12-22 | End: 2017-12-22

## 2017-12-22 RX ORDER — LEVETIRACETAM 250 MG/1
500 TABLET, FILM COATED ORAL
Qty: 0 | Refills: 0 | Status: DISCONTINUED | OUTPATIENT
Start: 2017-12-22 | End: 2017-12-23

## 2017-12-22 RX ORDER — ACETAMINOPHEN 500 MG
500 TABLET ORAL ONCE
Qty: 0 | Refills: 0 | Status: COMPLETED | OUTPATIENT
Start: 2017-12-22 | End: 2017-12-22

## 2017-12-22 RX ORDER — FLUCONAZOLE 150 MG/1
200 TABLET ORAL ONCE
Qty: 0 | Refills: 0 | Status: COMPLETED | OUTPATIENT
Start: 2017-12-22 | End: 2017-12-22

## 2017-12-22 RX ORDER — GANCICLOVIR SODIUM 50 MG/ML
INJECTION, POWDER, LYOPHILIZED, FOR SOLUTION INTRAVENTRICULAR
Qty: 0 | Refills: 0 | Status: DISCONTINUED | OUTPATIENT
Start: 2017-12-22 | End: 2017-12-22

## 2017-12-22 RX ORDER — SODIUM CHLORIDE 9 MG/ML
1000 INJECTION INTRAMUSCULAR; INTRAVENOUS; SUBCUTANEOUS ONCE
Qty: 0 | Refills: 0 | Status: COMPLETED | OUTPATIENT
Start: 2017-12-22 | End: 2017-12-22

## 2017-12-22 RX ORDER — CEFTRIAXONE 500 MG/1
1 INJECTION, POWDER, FOR SOLUTION INTRAMUSCULAR; INTRAVENOUS ONCE
Qty: 0 | Refills: 0 | Status: COMPLETED | OUTPATIENT
Start: 2017-12-22 | End: 2017-12-22

## 2017-12-22 RX ORDER — SUCRALFATE 1 G
1 TABLET ORAL
Qty: 0 | Refills: 0 | Status: DISCONTINUED | OUTPATIENT
Start: 2017-12-22 | End: 2017-12-26

## 2017-12-22 RX ORDER — POTASSIUM CHLORIDE 20 MEQ
10 PACKET (EA) ORAL
Qty: 0 | Refills: 0 | Status: COMPLETED | OUTPATIENT
Start: 2017-12-22 | End: 2017-12-22

## 2017-12-22 RX ADMIN — SODIUM CHLORIDE 50 MILLILITER(S): 9 INJECTION, SOLUTION INTRAVENOUS at 12:22

## 2017-12-22 RX ADMIN — PANTOPRAZOLE SODIUM 40 MILLIGRAM(S): 20 TABLET, DELAYED RELEASE ORAL at 11:50

## 2017-12-22 RX ADMIN — Medication 500 MILLIGRAM(S): at 21:54

## 2017-12-22 RX ADMIN — FLUCONAZOLE 100 MILLIGRAM(S): 150 TABLET ORAL at 04:28

## 2017-12-22 RX ADMIN — ALBUTEROL 1 PUFF(S): 90 AEROSOL, METERED ORAL at 11:01

## 2017-12-22 RX ADMIN — LEVETIRACETAM 500 MILLIGRAM(S): 250 TABLET, FILM COATED ORAL at 06:26

## 2017-12-22 RX ADMIN — Medication 100 GRAM(S): at 08:47

## 2017-12-22 RX ADMIN — BUDESONIDE AND FORMOTEROL FUMARATE DIHYDRATE 2 PUFF(S): 160; 4.5 AEROSOL RESPIRATORY (INHALATION) at 20:09

## 2017-12-22 RX ADMIN — AZITHROMYCIN 255 MILLIGRAM(S): 500 TABLET, FILM COATED ORAL at 03:27

## 2017-12-22 RX ADMIN — GANCICLOVIR SODIUM 100 MILLIGRAM(S): 50 INJECTION, POWDER, LYOPHILIZED, FOR SOLUTION INTRAVENTRICULAR at 21:54

## 2017-12-22 RX ADMIN — ALBUTEROL 1 PUFF(S): 90 AEROSOL, METERED ORAL at 23:46

## 2017-12-22 RX ADMIN — PANTOPRAZOLE SODIUM 40 MILLIGRAM(S): 20 TABLET, DELAYED RELEASE ORAL at 01:15

## 2017-12-22 RX ADMIN — Medication 500 MILLIGRAM(S): at 09:42

## 2017-12-22 RX ADMIN — Medication 10 MILLILITER(S): at 11:50

## 2017-12-22 RX ADMIN — ATAZANAVIR 300 MILLIGRAM(S): 200 CAPSULE ORAL at 11:50

## 2017-12-22 RX ADMIN — EMTRICITABINE AND TENOFOVIR DISOPROXIL FUMARATE 1 TABLET(S): 200; 300 TABLET, FILM COATED ORAL at 11:50

## 2017-12-22 RX ADMIN — SODIUM CHLORIDE 500 MILLILITER(S): 9 INJECTION INTRAMUSCULAR; INTRAVENOUS; SUBCUTANEOUS at 00:55

## 2017-12-22 RX ADMIN — Medication 200 MILLIGRAM(S): at 09:27

## 2017-12-22 RX ADMIN — BUDESONIDE AND FORMOTEROL FUMARATE DIHYDRATE 2 PUFF(S): 160; 4.5 AEROSOL RESPIRATORY (INHALATION) at 09:24

## 2017-12-22 RX ADMIN — Medication 500 MILLIGRAM(S): at 22:30

## 2017-12-22 RX ADMIN — Medication 100 MILLIEQUIVALENT(S): at 08:47

## 2017-12-22 RX ADMIN — CEFTRIAXONE 100 GRAM(S): 500 INJECTION, POWDER, FOR SOLUTION INTRAMUSCULAR; INTRAVENOUS at 02:51

## 2017-12-22 RX ADMIN — ALBUTEROL 1 PUFF(S): 90 AEROSOL, METERED ORAL at 20:14

## 2017-12-22 RX ADMIN — RITONAVIR 100 MILLIGRAM(S): 100 TABLET, FILM COATED ORAL at 11:50

## 2017-12-22 RX ADMIN — Medication 1 GRAM(S): at 23:44

## 2017-12-22 RX ADMIN — FAMOTIDINE 20 MILLIGRAM(S): 10 INJECTION INTRAVENOUS at 06:27

## 2017-12-22 RX ADMIN — LEVETIRACETAM 500 MILLIGRAM(S): 250 TABLET, FILM COATED ORAL at 20:09

## 2017-12-22 RX ADMIN — HEPARIN SODIUM 5000 UNIT(S): 5000 INJECTION INTRAVENOUS; SUBCUTANEOUS at 06:27

## 2017-12-22 NOTE — PROGRESS NOTE ADULT - SUBJECTIVE AND OBJECTIVE BOX
Patient is a 57y old  Female who presents with a chief complaint of odynophagia (22 Dec 2017 02:18)      INTERVAL HPI/OVERNIGHT EVENTS:    Pt. seen and examined at 3PM  Pt. c/o dysphagia and odynophagia; weight loss  No F/C, CP, SOB, N/V/D    Review of Systems: 12 point review of systems otherwise negative    MEDICATIONS  (STANDING):  ALBUTerol    90 MICROgram(s) HFA Inhaler 1 Puff(s) Inhalation four times a day  atazanavir 300 milliGRAM(s) Oral daily  buDESOnide 160 MICROgram(s)/formoterol 4.5 MICROgram(s) Inhaler 2 Puff(s) Inhalation two times a day  dextrose 5% + sodium chloride 0.9%. 1000 milliLiter(s) (50 mL/Hr) IV Continuous <Continuous>  heparin  Injectable 5000 Unit(s) SubCutaneous every 12 hours  levETIRAcetam 500 milliGRAM(s) Oral two times a day  pantoprazole  Injectable 40 milliGRAM(s) IV Push daily  ritonavir Tablet 100 milliGRAM(s) Oral daily  trimethoprim  40 mG/sulfamethoxazole 200 mG Suspension 10 milliLiter(s) Oral daily    MEDICATIONS  (PRN):  acetaminophen    Suspension. 500 milliGRAM(s) Oral every 6 hours PRN Moderate Pain (4 - 6)  benzocaine 15 mG/menthol 3.6 mG Lozenge 1 Lozenge Oral every 3 hours PRN Sore Throat      Allergies    No Known Allergies    Intolerances          Vital Signs Last 24 Hrs  T(C): 36.2 (22 Dec 2017 14:41), Max: 37.1 (22 Dec 2017 06:35)  T(F): 97.2 (22 Dec 2017 14:41), Max: 98.7 (22 Dec 2017 06:35)  HR: 68 (22 Dec 2017 14:41) (60 - 95)  BP: 125/77 (22 Dec 2017 14:41) (112/71 - 151/84)  BP(mean): --  RR: 18 (22 Dec 2017 14:41) (18 - 22)  SpO2: 98% (22 Dec 2017 14:41) (95% - 99%)  CAPILLARY BLOOD GLUCOSE            Physical Exam:  (at 3PM)  Daily Height in cm: 162.56 (22 Dec 2017 14:41)    Daily   General:  cachectic-appearing  HEENT:  dry MM, no visible thrush, +hoarse voice  Abdomen:  soft NT ND  Extremities:  no edema B/L LE  Skin:  WWP  Neuro:  AAOx3    LABS:                        9.4    2.7   )-----------( 126      ( 22 Dec 2017 05:45 )             29.5     12-22    138  |  104  |  30<H>  ----------------------------<  88  3.7   |  23  |  1.12    Ca    8.0<L>      22 Dec 2017 05:43  Phos  3.1     12-22  Mg     1.9     12-22    TPro  7.7  /  Alb  3.3  /  TBili  0.3  /  DBili  x   /  AST  16  /  ALT  10  /  AlkPhos  73  12-22            RADIOLOGY & ADDITIONAL TESTS:    ---------------------------------------------------------------------------  I personally reviewed: [  ]EKG   [  ]CXR    [  ] CT    [  ]Other  ---------------------------------------------------------------------------  PLEASE CHECK WHEN PRESENT:     [  ]Heart Failure     [  ] Acute     [  ] Acute on Chronic     [  ] Chronic  -------------------------------------------------------------------     [  ]Diastolic [HFpEF]     [  ]Systolic [HFrEF]     [  ]Combined [HFpEF & HFrEF]     [  ]Other:  -------------------------------------------------------------------  [  ]SEVERINO     [  ]ATN     [  ]Reneal Medullary Necrosis     [  ]Renal Cortical Necrosis     [  ]Other Pathological Lesions:    [  ]CKD 1  [  ]CKD 2  [  ]CKD 3  [  ]CKD 4  [  ]CKD 5  [  ]Other  -------------------------------------------------------------------  [  ]Other/Unspecified:    --------------------------------------------------------------------    Abdominal Nutritional Status  [  ]Malnutrition: See Nutrition Note  [  ]Cachexia  [  ]Other:   [  ]Supplement Ordered:  [  ]Morbid Obesity (BMI >=40]

## 2017-12-22 NOTE — ED PROVIDER NOTE - DIAGNOSTIC INTERPRETATION
ER Physician: Amilcar Pinon  CHEST XRAY INTERPRETATION: RLL opacity, heart shadow normal, bony structures intact

## 2017-12-22 NOTE — CONSULT NOTE ADULT - ASSESSMENT
57F w/ HIV/AIDS (non-adherent to HAART, 7/2017 CD4: 80, VL: 74,424) presents to hospital w/ odynophagia w/ concern for esophagitis

## 2017-12-22 NOTE — ED PROVIDER NOTE - MEDICAL DECISION MAKING DETAILS
weight loss (reviewed emr from Nov, noted weight 45kg, and now 30s), w/ CD4 80, and currently has not taken HIV meds, possibly opportunistic infection/candidiasis, hydrate, ID consult, admission, nutrition consult as well.  pt afebrile, no hypoxia, lungs clear on exam, low suspicion for PCP

## 2017-12-22 NOTE — H&P ADULT - NSHPREVIEWOFSYSTEMS_GEN_ALL_CORE
CONSTITUTIONAL: +weakness, no fevers or chills  EYES/ENT: No visual changes;  No vertigo. +throat pain   NECK: No pain or stiffness  RESPIRATORY: No cough, wheezing, hemoptysis; No shortness of breath  CARDIOVASCULAR: No chest pain or palpitations  GASTROINTESTINAL: No abdominal or epigastric pain. No nausea, vomiting, or hematemesis; No diarrhea or constipation. No melena or hematochezia.  GENITOURINARY: No dysuria, frequency or hematuria  NEUROLOGICAL: No numbness or weakness  SKIN: No itching, burning, rashes, or lesions   All other review of systems is negative unless indicated above.

## 2017-12-22 NOTE — H&P ADULT - HISTORY OF PRESENT ILLNESS
Patient is a 57 year old female with a PMHx of HIV (on HAART but questionable compliance, 7/2017 CD4: 80, VL: 74,424), asthma, HepC, GERD and a seizure disorder (last seizure last month) Patient is a 57 year old female with a PMHx of HIV (on HAART but questionable compliance, 7/2017 CD4: 80, VL: 74,424), asthma, IVDA (used heroin yesterday), HepC, GERD and a seizure disorder (last seizure last month) who presents with two weeks of odynophagia. The patient said the symptoms got progressively worse and she can no longer swallow solids or liquids. She feels like something is stuck in her throat and she has to cough it up but is unable to. Because of these symptoms, she hasn't been able to take any of her medications. The patient also reports weight loss but cannot quantify how much. She has been feeling weak and reported falling in the street earlier last week. She is known to Dr Cornelius, who prescribed her PPIs. ROS negative for fever, chills, abdominal pain, diarrhea, cough, chest pain or palpitations.    In the ED VS were T 98.2 HR 95 /88 RR 22 O2 95 on RA. She was given cef, azithro, protonix and IVF.

## 2017-12-22 NOTE — CONSULT NOTE ADULT - ASSESSMENT
57yr old F with a PMHx of HCV (unsure if Rx), GERD, HIV/AIDs (CD4: 80, VL: 74,424 - 07/2017, on HAART with questionable compliance), Asthma, IVDA (used heroin yesterday), seizure disorder (last seizure last month) who presents for evaluation of difficulty swallowing.    # Dysphagia/Odynophagia -   - ddx - infectious (fungal, viral), esophagitis, esophageal stricture, malignancy   - continue PPI  - since she just completed a course of antifungals - per patient and no response might need evaluation to r/o viral esophageal ulcers  - NPO for now  - will plan for EGD as schedule permits    Will discuss with attending Dr Bronson BROOKS following

## 2017-12-22 NOTE — ED PROVIDER NOTE - PHYSICAL EXAMINATION
CON: ao x 3, cachectic, HENMT: clear oropharynx, soft neck, no obvious oropharyngeal thrush noted, HEAD: atraumatic, CV: rrr, equal pulses b/l, RESP: cta b/l, GI: +BS, soft, nontender, no rebound, no guarding, SKIN: no rash, noted dry skin and tenting, MSK: no deformities

## 2017-12-22 NOTE — CONSULT NOTE ADULT - SUBJECTIVE AND OBJECTIVE BOX
HPI:  57yr old F with a PMHx of HCV (unsure if Rx), GERD, HIV/AIDs (CD4: 80, VL: 74,424 - 07/2017, on HAART with questionable compliance), Asthma, IVDA (used heroin yesterday), seizure disorder (last seizure last month) who presents for evaluation of difficulty swallowing.  According to patient for the last 2 weeks she has had difficulty and significant pain when swallowing both solids and liquids, feels that it gets hung up in her upper throat and mid sternum, not sure which consistency started giving problems first, associated with nausea, and intermittent emesis (post prandial, bilious sometimes, no blood, no coffee grounds), weight loss (reports a 30lbs weight loss in 2 weeks), epigastric abdominal discomfort, weakness, and inability to take her medications. She went to see her PCP Dr Cornelius whom patient states told her she had oral thrush, and started her on an antifungal that she just completed a few days ago. Patient reports that the pain/difficulty swallowing has not improved since completing the course of antifungal.  She denies fever, chills, bleeding (melena, BRBPR), constipation/diarrhea, abdominal pain, sick contacts, recent travel, dysuria, leg swelling.    EGD - never  Colonoscopy - never      PAST MEDICAL & SURGICAL HISTORY:  Sciatica  Lumbar disc herniation with radiculopathy  Epilepsy: Seizure disorder  Chronic obstructive pulmonary disease: COPD (chronic obstructive pulmonary disease)  Asymptomatic human immunodeficiency virus infection: HIV (human immunodeficiency virus infection)  Pneumonia due to methicillin resistant Staphylococcus aureus: MRSA pneumonia  Mixed, or nondependent drug abuse: IV drug abuse  Other postprocedural status: left hand abscess      REVIEW OF SYSTEMS  Apart from items noted in the HPI a 10point ROS was negative  	    MEDICATIONS  (STANDING):  ALBUTerol    90 MICROgram(s) HFA Inhaler 1 Puff(s) Inhalation four times a day  atazanavir 300 milliGRAM(s) Oral daily  buDESOnide 160 MICROgram(s)/formoterol 4.5 MICROgram(s) Inhaler 2 Puff(s) Inhalation two times a day  emtricitabine 200 mG/tenofovir 300 mG (TRUVADA) 1 Tablet(s) Oral daily  heparin  Injectable 5000 Unit(s) SubCutaneous every 12 hours  levETIRAcetam 500 milliGRAM(s) Oral two times a day  pantoprazole  Injectable 40 milliGRAM(s) IV Push daily  potassium chloride  10 mEq/100 mL IVPB 10 milliEquivalent(s) IV Intermittent every 1 hour  ritonavir Tablet 100 milliGRAM(s) Oral daily  trimethoprim  40 mG/sulfamethoxazole 200 mG Suspension 10 milliLiter(s) Oral daily    MEDICATIONS  (PRN):    Allergies  No Known Allergies    Intolerances    SOCIAL HISTORY:  Uses IV heroin - last use a few days ago, denies tobacco or alcohol use    FAMILY HISTORY:  denies FHx of stomach/colon/pancreatic cancer or liver problems    Vital Signs Last 24 Hrs  T(C): 36.6 (22 Dec 2017 08:14), Max: 37.1 (22 Dec 2017 06:35)  T(F): 97.9 (22 Dec 2017 08:14), Max: 98.7 (22 Dec 2017 06:35)  HR: 60 (22 Dec 2017 08:14) (60 - 95)  BP: 128/77 (22 Dec 2017 08:14) (112/71 - 151/84)  BP(mean): --  RR: 18 (22 Dec 2017 08:14) (18 - 22)  SpO2: 99% (22 Dec 2017 08:14) (95% - 99%)    PHYSICAL EXAM:  GEN - middle aged F looking older than stated age, lying in bed in no distress, anicteric, afebrile, not pale  ENMT: no oral thrush appreciated  Respiratory: good air entry  Cardiovascular: s1 s2 no M   Gastrointestinal: full, osft, BS+, NT, NR, NG, no masses or organs palpated  Extremities: no edema  Neurological: AAOx3 non focal      LABS:               9.4    2.7   )-----------( 126      ( 22 Dec 2017 05:45 )             29.5     138  |  104  |  30<H>  ----------------------------<  88  3.7   |  23  |  1.12    Ca    8.0<L>      22 Dec 2017 05:43  Phos  3.1     12-22  Mg     1.9     12-22    TPro  7.7  /  Alb  3.3  /  TBili  0.3  /  DBili  x   /  AST  16  /  ALT  10  /  AlkPhos  73  12-22        RADIOLOGY & ADDITIONAL STUDIES:

## 2017-12-22 NOTE — H&P ADULT - ASSESSMENT
Patient is a 57 year old female with a PMHx of HIV (on HAART but questionable compliance, 7/2017 CD4: 80, VL: 74,424), asthma, IVDA (used heroin yesterday), HepC, GERD and a seizure disorder (last seizure last month) who presents with odynophagia and weight loss

## 2017-12-22 NOTE — ED PROVIDER NOTE - PROGRESS NOTE DETAILS
xray reviewed, not c/w PCP xray reviewed, noted opacity to RLL, pt reported cough and sob, xray not c/w PCP pna findings, will give dose of abx to start

## 2017-12-22 NOTE — ED PROVIDER NOTE - OBJECTIVE STATEMENT
57 yof pw weight loss, states 30lb in the last several weeks, as she's having pain w/ swallowing.  pain to lower throat and sternum.  seen by PMD, given prilosec, w/o relief.  states only taking small sips of water.  no exertional cp or sob.  hx of HIV, reports compliance w/ meds but unable to take for the last 2-3 weeks w/ sx. 57 yof pw weight loss, states 30lb in the last several weeks, as she's having pain w/ swallowing.  pain to lower throat and sternum.  seen by PMD, given prilosec, w/o relief.  also states coughed yesterday.  states only taking small sips of water.  no exertional cp or sob.  hx of HIV, reports compliance w/ meds but unable to take for the last 2-3 weeks w/ sx.

## 2017-12-22 NOTE — ED PROVIDER NOTE - CHIEF COMPLAINT
The patient is a 57y Female complaining of shortness of breath. The patient is a 57y Female complaining of weight loss, pain w/ swallowing

## 2017-12-22 NOTE — CONSULT NOTE ADULT - SUBJECTIVE AND OBJECTIVE BOX
HIV Consult Note    Patient is a 57y old  Female who presents with a chief complaint of odynophagia (22 Dec 2017 02:18), well known to [facility].    Patient refused to talk further, but says that she doesn't take her medications because she cannot swallow them.  Unable to obtain further history / ROS.    Outpatient HIV Provider: Adryan / [facility]  T cell tyler: 80  Highest Viral Load: 74K   Current ARV regimen: Truvada, prezista, norvir  ARV adherence: Non-adherent    PAST MEDICAL & SURGICAL HISTORY:  Sciatica  Lumbar disc herniation with radiculopathy  Epilepsy: Seizure disorder  Chronic obstructive pulmonary disease: COPD (chronic obstructive pulmonary disease)  Asymptomatic human immunodeficiency virus infection: HIV (human immunodeficiency virus infection)  Pneumonia due to methicillin resistant Staphylococcus aureus: MRSA pneumonia  Mixed, or nondependent drug abuse: IV drug abuse  Other postprocedural status: left hand abscess    REVIEW OF SYSTEMS:  Patient refusing exam / interview    PHYSICAL EXAM:    Vital Signs Last 24 Hrs  T(C): 36.6 (22 Dec 2017 08:14), Max: 37.1 (22 Dec 2017 06:35)  T(F): 97.9 (22 Dec 2017 08:14), Max: 98.7 (22 Dec 2017 06:35)  HR: 60 (22 Dec 2017 08:14) (60 - 95)  BP: 128/77 (22 Dec 2017 08:14) (112/71 - 151/84)  BP(mean): --  RR: 18 (22 Dec 2017 08:14) (18 - 22)  SpO2: 99% (22 Dec 2017 08:14) (95% - 99%)    Patient refusing exam / interview    General: [X] AAOx3 [X] Comfortable [X] no acute distress   HEENT: [X] PERRL     MEDICATIONS  (STANDING):  acetaminophen  IVPB. 500 milliGRAM(s) IV Intermittent once  ALBUTerol    90 MICROgram(s) HFA Inhaler 1 Puff(s) Inhalation four times a day  atazanavir 300 milliGRAM(s) Oral daily  buDESOnide 160 MICROgram(s)/formoterol 4.5 MICROgram(s) Inhaler 2 Puff(s) Inhalation two times a day  dextrose 5% + sodium chloride 0.9%. 1000 milliLiter(s) (50 mL/Hr) IV Continuous <Continuous>  emtricitabine 200 mG/tenofovir 300 mG (TRUVADA) 1 Tablet(s) Oral daily  heparin  Injectable 5000 Unit(s) SubCutaneous every 12 hours  levETIRAcetam 500 milliGRAM(s) Oral two times a day  pantoprazole  Injectable 40 milliGRAM(s) IV Push daily  ritonavir Tablet 100 milliGRAM(s) Oral daily  trimethoprim  40 mG/sulfamethoxazole 200 mG Suspension 10 milliLiter(s) Oral daily    MEDICATIONS  (PRN):    Allergies    No Known Allergies    Intolerances      LABS:                        9.4    2.7   )-----------( 126      ( 22 Dec 2017 05:45 )             29.5                           9.4    2.7   )-----------( 126      ( 22 Dec 2017 05:45 )             29.5     Neutrophils:67.6   Bands:--   Lymphocytes:25.0   Monocytes:5.5   Eosinophils:1.5   Basophils:0.4   12-22 @ 05:45    12-22    138  |  104  |  30<H>  ----------------------------<  88  3.7   |  23  |  1.12    Ca    8.0<L>      22 Dec 2017 05:43  Phos  3.1     12-22  Mg     1.9     12-22    TPro  7.7  /  Alb  3.3  /  TBili  0.3  /  DBili  x   /  AST  16  /  ALT  10  /  AlkPhos  73  12-22    4.87 lg /mL    8 %  80 /uL    RADIOLOGY:  < from: Xray Chest 1 View AP- PORTABLE-Urgent (12.22.17 @ 02:09) >  FINDINGS: Portable view of the chest is compared to 1/14/2015 and   demonstrates interval improvement in prominence of the interstitium,   consistent with interstitial pneumonitis, slightly intervally improved   since prior study. Midline trachea. Normal heart size.    < end of copied text >

## 2017-12-22 NOTE — H&P ADULT - NSHPLABSRESULTS_GEN_ALL_CORE
10.3   3.4   )-----------( 139      ( 22 Dec 2017 00:33 )             31.5     12-22    140  |  102  |  31<H>  ----------------------------<  115<H>  3.8   |  23  |  1.19    Ca    8.8      22 Dec 2017 00:33  Phos  3.4     12-22  Mg     2.0     12-22    TPro  7.7  /  Alb  3.3  /  TBili  0.3  /  DBili  x   /  AST  16  /  ALT  10  /  AlkPhos  73  12-22        CARDIAC MARKERS ( 22 Dec 2017 00:33 )  x     / <0.01 ng/mL / 98 U/L / x     / 4.1 ng/mL      Serum Pro-Brain Natriuretic Peptide: 385 pg/mL (12-22 @ 00:33)    RADIOLOGY, EKG & ADDITIONAL TESTS: Reviewed.   CXR: RLL infiltrate

## 2017-12-22 NOTE — H&P ADULT - PROBLEM SELECTOR PLAN 2
As per outpatient records, the patient's BMI was 14.08 11/29 and ranged from 16 to 17 in the past few years. She has been prescribed ensure and megastrol, suggesting that this is not a new development. Her recent weight loss may be related to decreased PO intake from odynophagia. Also concern for malignancy  -Nutrition consult   -Ensure As per outpatient records, the patient's BMI was 14.08 11/29 and ranged from 16 to 17 in the past few years. She has been prescribed ensure and megestrol, suggesting that this is not a new development. Her recent weight loss may be related to decreased PO intake from odynophagia. Also concern for malignancy  -Nutrition consult   -Ensure when no longer NPO

## 2017-12-22 NOTE — H&P ADULT - PROBLEM SELECTOR PLAN 3
Known to Dr Cornelius but often misses appointments. Last work up from 7/2017 CD4: 80, VL: 74,424.  -C/w truvada, norvir (should it be BID?), reyatax and bactrim for ppx  -Stressed importance of routine follow up Known to Dr Cornelius but often misses appointments. Last work up from 7/2017 CD4: 80, VL: 74,424.  -C/w truvada, norvir (should it be BID?), reyatax and bactrim for ppx  -Stressed importance of routine follow up  -HIV consult

## 2017-12-22 NOTE — H&P ADULT - NSHPPHYSICALEXAM_GEN_ALL_CORE
Constitutional: WDWN resting comfortably in bed; NAD, cachectic   Head: NC/AT  Eyes: PERRL, EOMI, anicteric sclera  ENT: no nasal discharge; uvula midline, no oropharyngeal erythema or exudates; dry MM  Neck: supple; no JVD or thyromegaly  Respiratory: CTA B/L; no W/R/R, no retractions  Cardiac: +S1/S2; RRR; no M/R/G; PMI non-displaced  Gastrointestinal: soft, NT/ND; no rebound or guarding; +BSx4  Back: spine midline, no bony tenderness or step-offs; no CVAT B/L  Extremities: WWP, no clubbing or cyanosis; no peripheral edema  Musculoskeletal: NROM x4; no joint swelling, tenderness or erythema  Vascular: 2+ radial, femoral, DP/PT pulses B/L  Dermatologic: skin warm, dry and intact; no rashes, wounds, or scars. Track fawad on right forearm  Lymphatic: no submandibular or cervical LAD  Neurologic: AAOx3; CNII-XII grossly intact; no focal deficits  Psychiatric: affect and characteristics of appearance, verbalizations, behaviors are appropriate

## 2017-12-22 NOTE — H&P ADULT - PROBLEM SELECTOR PLAN 1
Etiology includes esophageal candidiasis vs CMV vs obstruction from malignancy in setting of weight loss vs stricture vs achalasia. Patient was easily able to swallow tea in the ED but may require additional GI work up. She has poor compliance with her outpatient appointments and will likely to be lost to follow up, so inpatient assessment may be warranted  -Will treat candida with diflucan   -IVF  -Dysphagia screen  -Consider GI consult if not tolerating regular diet tomorrow  -Pepcid Etiology includes esophageal candidiasis vs CMV vs obstruction from malignancy in setting of weight loss vs stricture vs achalasia. Patient was easily able to swallow tea in the ED but may require additional GI work up. She has poor compliance with her outpatient appointments and will likely to be lost to follow up, so inpatient assessment may be warranted  -Will treat candida with diflucan   -IVF  -GI consult if not tolerating regular diet tomorrow  -Pepcid Etiology includes esophageal candidiasis vs CMV vs obstruction from malignancy in setting of weight loss vs stricture vs achalasia. Patient was easily able to swallow tea in the ED but may require additional GI work up. She has poor compliance with her outpatient appointments and will likely to be lost to follow up, so inpatient assessment may be warranted  -Will treat candida with diflucan   -IVF  -GI consult: EGD  -Pepcid

## 2017-12-22 NOTE — CONSULT NOTE ADULT - PROBLEM SELECTOR RECOMMENDATION 9
Non-adherent to medication in setting of not being able to swallow w/ hx of non-adherence.  SEVERINO on admission w/ CrCl of 30.  - D/c truvada and start descovy (TAF in descovy > TDF in truvada) and dose q48hr  - C/w Boosted PI regimen  - If patient cannot swallow pills, will need improved regimen but may require re-genotyping given poor adherence  - F/u repeat CD4 / VL  - HIV team will continue to follow  - Patient will need to follow up at [facility] ASAP post d/c

## 2017-12-23 ENCOUNTER — TRANSCRIPTION ENCOUNTER (OUTPATIENT)
Age: 57
End: 2017-12-23

## 2017-12-23 DIAGNOSIS — K22.10 ULCER OF ESOPHAGUS WITHOUT BLEEDING: ICD-10-CM

## 2017-12-23 LAB
ANION GAP SERPL CALC-SCNC: 14 MMOL/L — SIGNIFICANT CHANGE UP (ref 5–17)
BLD GP AB SCN SERPL QL: NEGATIVE — SIGNIFICANT CHANGE UP
BUN SERPL-MCNC: 20 MG/DL — SIGNIFICANT CHANGE UP (ref 7–23)
CALCIUM SERPL-MCNC: 7.8 MG/DL — LOW (ref 8.4–10.5)
CHLORIDE SERPL-SCNC: 106 MMOL/L — SIGNIFICANT CHANGE UP (ref 96–108)
CO2 SERPL-SCNC: 20 MMOL/L — LOW (ref 22–31)
CREAT ?TM UR-MCNC: 113 MG/DL — SIGNIFICANT CHANGE UP
CREAT SERPL-MCNC: 1.08 MG/DL — SIGNIFICANT CHANGE UP (ref 0.5–1.3)
FERRITIN SERPL-MCNC: 124 NG/ML — SIGNIFICANT CHANGE UP (ref 15–150)
GLUCOSE SERPL-MCNC: 163 MG/DL — HIGH (ref 70–99)
HCT VFR BLD CALC: 27.7 % — LOW (ref 34.5–45)
HGB BLD-MCNC: 8.8 G/DL — LOW (ref 11.5–15.5)
HIV-1 VIRAL LOAD RESULT: (no result)
HIV1 RNA # SERPL NAA+PROBE: SIGNIFICANT CHANGE UP
HIV1 RNA SER-IMP: SIGNIFICANT CHANGE UP
HIV1 RNA SERPL NAA+PROBE-ACNC: (no result)
HIV1 RNA SERPL NAA+PROBE-LOG#: 4.97 — SIGNIFICANT CHANGE UP
IRON SATN MFR SERPL: 35 % — SIGNIFICANT CHANGE UP (ref 14–50)
IRON SATN MFR SERPL: 51 UG/DL — SIGNIFICANT CHANGE UP (ref 30–160)
MAGNESIUM SERPL-MCNC: 1.8 MG/DL — SIGNIFICANT CHANGE UP (ref 1.6–2.6)
MCHC RBC-ENTMCNC: 25.8 PG — LOW (ref 27–34)
MCHC RBC-ENTMCNC: 31.8 G/DL — LOW (ref 32–36)
MCV RBC AUTO: 81.2 FL — SIGNIFICANT CHANGE UP (ref 80–100)
PCP SPEC-MCNC: SIGNIFICANT CHANGE UP
PLATELET # BLD AUTO: 104 K/UL — LOW (ref 150–400)
POTASSIUM SERPL-MCNC: 3.2 MMOL/L — LOW (ref 3.5–5.3)
POTASSIUM SERPL-SCNC: 3.2 MMOL/L — LOW (ref 3.5–5.3)
RBC # BLD: 3.41 M/UL — LOW (ref 3.8–5.2)
RBC # FLD: 14.6 % — SIGNIFICANT CHANGE UP (ref 10.3–16.9)
RH IG SCN BLD-IMP: POSITIVE — SIGNIFICANT CHANGE UP
SODIUM SERPL-SCNC: 140 MMOL/L — SIGNIFICANT CHANGE UP (ref 135–145)
SODIUM UR-SCNC: 182 MMOL/L — SIGNIFICANT CHANGE UP
TIBC SERPL-MCNC: 146 UG/DL — LOW (ref 220–430)
TRANSFERRIN SERPL-MCNC: 129 MG/DL — LOW (ref 200–360)
UIBC SERPL-MCNC: 95 UG/DL — LOW (ref 110–370)
WBC # BLD: 2.1 K/UL — LOW (ref 3.8–10.5)
WBC # FLD AUTO: 2.1 K/UL — LOW (ref 3.8–10.5)

## 2017-12-23 PROCEDURE — 99232 SBSQ HOSP IP/OBS MODERATE 35: CPT

## 2017-12-23 RX ORDER — FAMOTIDINE 10 MG/ML
20 INJECTION INTRAVENOUS ONCE
Qty: 0 | Refills: 0 | Status: COMPLETED | OUTPATIENT
Start: 2017-12-23 | End: 2017-12-23

## 2017-12-23 RX ORDER — POTASSIUM CHLORIDE 20 MEQ
40 PACKET (EA) ORAL ONCE
Qty: 0 | Refills: 0 | Status: COMPLETED | OUTPATIENT
Start: 2017-12-23 | End: 2017-12-23

## 2017-12-23 RX ORDER — LEVETIRACETAM 250 MG/1
500 TABLET, FILM COATED ORAL EVERY 12 HOURS
Qty: 0 | Refills: 0 | Status: DISCONTINUED | OUTPATIENT
Start: 2017-12-23 | End: 2017-12-26

## 2017-12-23 RX ORDER — ACETAMINOPHEN 500 MG
500 TABLET ORAL ONCE
Qty: 0 | Refills: 0 | Status: COMPLETED | OUTPATIENT
Start: 2017-12-23 | End: 2017-12-23

## 2017-12-23 RX ORDER — NYSTATIN 500MM UNIT
500000 POWDER (EA) MISCELLANEOUS
Qty: 0 | Refills: 0 | Status: DISCONTINUED | OUTPATIENT
Start: 2017-12-23 | End: 2017-12-23

## 2017-12-23 RX ORDER — RITONAVIR 100 MG/1
100 TABLET, FILM COATED ORAL DAILY
Qty: 0 | Refills: 0 | Status: DISCONTINUED | OUTPATIENT
Start: 2017-12-24 | End: 2017-12-26

## 2017-12-23 RX ORDER — SIMETHICONE 80 MG/1
80 TABLET, CHEWABLE ORAL EVERY 12 HOURS
Qty: 0 | Refills: 0 | Status: DISCONTINUED | OUTPATIENT
Start: 2017-12-23 | End: 2017-12-26

## 2017-12-23 RX ORDER — VALACYCLOVIR 500 MG/1
1000 TABLET, FILM COATED ORAL EVERY 12 HOURS
Qty: 0 | Refills: 0 | Status: DISCONTINUED | OUTPATIENT
Start: 2017-12-23 | End: 2017-12-26

## 2017-12-23 RX ORDER — MAGNESIUM SULFATE 500 MG/ML
1 VIAL (ML) INJECTION ONCE
Qty: 0 | Refills: 0 | Status: COMPLETED | OUTPATIENT
Start: 2017-12-23 | End: 2017-12-23

## 2017-12-23 RX ORDER — BENZOCAINE AND MENTHOL 5; 1 G/100ML; G/100ML
1 LIQUID ORAL EVERY 4 HOURS
Qty: 0 | Refills: 0 | Status: DISCONTINUED | OUTPATIENT
Start: 2017-12-23 | End: 2017-12-26

## 2017-12-23 RX ADMIN — Medication 100 GRAM(S): at 10:30

## 2017-12-23 RX ADMIN — LEVETIRACETAM 500 MILLIGRAM(S): 250 TABLET, FILM COATED ORAL at 06:16

## 2017-12-23 RX ADMIN — Medication 40 MILLIEQUIVALENT(S): at 10:32

## 2017-12-23 RX ADMIN — LEVETIRACETAM 420 MILLIGRAM(S): 250 TABLET, FILM COATED ORAL at 18:34

## 2017-12-23 RX ADMIN — BENZOCAINE AND MENTHOL 1 LOZENGE: 5; 1 LIQUID ORAL at 16:20

## 2017-12-23 RX ADMIN — Medication 1 GRAM(S): at 23:10

## 2017-12-23 RX ADMIN — BUDESONIDE AND FORMOTEROL FUMARATE DIHYDRATE 2 PUFF(S): 160; 4.5 AEROSOL RESPIRATORY (INHALATION) at 10:27

## 2017-12-23 RX ADMIN — Medication 40 MILLIEQUIVALENT(S): at 10:31

## 2017-12-23 RX ADMIN — Medication 200 MILLIGRAM(S): at 23:08

## 2017-12-23 RX ADMIN — Medication 500 MILLIGRAM(S): at 16:31

## 2017-12-23 RX ADMIN — Medication 10 MILLILITER(S): at 16:06

## 2017-12-23 RX ADMIN — RITONAVIR 100 MILLIGRAM(S): 100 TABLET, FILM COATED ORAL at 12:44

## 2017-12-23 RX ADMIN — Medication 1 GRAM(S): at 12:56

## 2017-12-23 RX ADMIN — Medication 200 MILLIGRAM(S): at 07:45

## 2017-12-23 RX ADMIN — ATAZANAVIR 300 MILLIGRAM(S): 200 CAPSULE ORAL at 12:44

## 2017-12-23 RX ADMIN — Medication 200 MILLIGRAM(S): at 16:01

## 2017-12-23 RX ADMIN — ALBUTEROL 1 PUFF(S): 90 AEROSOL, METERED ORAL at 23:09

## 2017-12-23 RX ADMIN — BENZOCAINE AND MENTHOL 1 LOZENGE: 5; 1 LIQUID ORAL at 12:44

## 2017-12-23 RX ADMIN — BUDESONIDE AND FORMOTEROL FUMARATE DIHYDRATE 2 PUFF(S): 160; 4.5 AEROSOL RESPIRATORY (INHALATION) at 23:08

## 2017-12-23 RX ADMIN — PANTOPRAZOLE SODIUM 40 MILLIGRAM(S): 20 TABLET, DELAYED RELEASE ORAL at 12:43

## 2017-12-23 RX ADMIN — SODIUM CHLORIDE 50 MILLILITER(S): 9 INJECTION, SOLUTION INTRAVENOUS at 23:12

## 2017-12-23 RX ADMIN — Medication 500 MILLIGRAM(S): at 08:15

## 2017-12-23 RX ADMIN — SIMETHICONE 80 MILLIGRAM(S): 80 TABLET, CHEWABLE ORAL at 19:41

## 2017-12-23 RX ADMIN — ALBUTEROL 1 PUFF(S): 90 AEROSOL, METERED ORAL at 18:30

## 2017-12-23 RX ADMIN — HEPARIN SODIUM 5000 UNIT(S): 5000 INJECTION INTRAVENOUS; SUBCUTANEOUS at 18:33

## 2017-12-23 RX ADMIN — ALBUTEROL 1 PUFF(S): 90 AEROSOL, METERED ORAL at 06:17

## 2017-12-23 RX ADMIN — Medication 1 GRAM(S): at 06:17

## 2017-12-23 RX ADMIN — VALACYCLOVIR 1000 MILLIGRAM(S): 500 TABLET, FILM COATED ORAL at 18:56

## 2017-12-23 RX ADMIN — ALBUTEROL 1 PUFF(S): 90 AEROSOL, METERED ORAL at 10:29

## 2017-12-23 RX ADMIN — HEPARIN SODIUM 5000 UNIT(S): 5000 INJECTION INTRAVENOUS; SUBCUTANEOUS at 06:17

## 2017-12-23 RX ADMIN — Medication 1 GRAM(S): at 18:36

## 2017-12-23 RX ADMIN — BENZOCAINE AND MENTHOL 1 LOZENGE: 5; 1 LIQUID ORAL at 23:09

## 2017-12-23 RX ADMIN — FAMOTIDINE 20 MILLIGRAM(S): 10 INJECTION INTRAVENOUS at 19:44

## 2017-12-23 NOTE — DISCHARGE NOTE ADULT - HOSPITAL COURSE
Patient is a 57 year old female with a PMHx of HIV (on HAART but questionable compliance, 7/2017 CD4: 80, VL: 74,424), asthma, IVDA (used heroin yesterday), HepC, GERD and a seizure disorder (last seizure last month) who presented with two weeks of odynophagia. The patient said the symptoms got progressively worse and she could no longer swallow solids or liquids. She felt like something was stuck in her throat and she has to cough it up but is unable to. Because of these symptoms, she hadn't been able to take any of her medications. The patient also reported weight loss but could not quantify how much. She had been feeling weak and reported falling in the street earlier last week. She is known to Dr Cornelius, who prescribed her PPIs. ROS negative for fever, chills, abdominal pain, diarrhea, cough, chest pain or palpitations.  In the ED VS were T 98.2 HR 95 /88 RR 22 O2 95 on RA. She was given cef, azithro, protonix and IVF.  GI performed EGD which showed erosive esophagitis suspicious for HSV or CMV esophagitis.  Biopsies were taken.  Gastritis was also seen. She was initiated on ganciclovir IV, carafate, and PPI.  Pain was controlled with lozenges and IV tylenol.  She was maintained on HAART.  Diet was advanced as tolerated.  She was switched from ganciclovir to Valtrex. Patient is a 57 year old female with a PMHx of HIV (on HAART but questionable compliance, 7/2017 CD4: 80, VL: 74,424), asthma, IVDA (used heroin yesterday), HepC, GERD and a seizure disorder (last seizure last month) who presented with two weeks of odynophagia. The patient said the symptoms got progressively worse and she could no longer swallow solids or liquids. She felt like something was stuck in her throat and she has to cough it up but is unable to. Because of these symptoms, she hadn't been able to take any of her medications. The patient also reported weight loss but could not quantify how much. She had been feeling weak and reported falling in the street earlier last week. She is known to Dr Cornelius, who prescribed her PPIs. ROS negative for fever, chills, abdominal pain, diarrhea, cough, chest pain or palpitations.  In the ED VS were T 98.2 HR 95 /88 RR 22 O2 95 on RA. She was given cef, azithro, protonix and IVF.  GI performed EGD which showed erosive esophagitis suspicious for HSV or CMV esophagitis.  Biopsies were taken.  Gastritis was also seen. She was initiated on ganciclovir IV, carafate, and PPI.  Pain was controlled with lozenges and IV tylenol.  She was maintained on HAART.  Diet was advanced as tolerated.  She was switched from ganciclovir to Valtrex.  CD4 returned at 37 so she was started on azithro weekly.  Her hep C VL returned >500k.  LFTs WNL and INR <1.  Abdominal U/S showed hepatosplenomegaly, trace hepatic ascites, and heterogeneous liver parenchyma.  She was discharged on Valtrex as well as phenergan for nausea.

## 2017-12-23 NOTE — PROGRESS NOTE ADULT - ATTENDING COMMENTS
p/e  +thrush  -c/w GI reccs for esophagitis Gancyclovir  -c/w HAART therapy, f/u HIV team reccs  patient reports difficulty swallowing 2/2 pain, will see if there are iv formulations of some of her HIV medications  also will start Nystatin oral swish and swallow  -wt loss appears to be consistent with past weights  f/u nutrition reccs. will consider hem/onc workup for malignancy

## 2017-12-23 NOTE — PROGRESS NOTE ADULT - SUBJECTIVE AND OBJECTIVE BOX
INTERVAL HPI/OVERNIGHT EVENTS:  Went for EGD late yesterday which showed esophageal erosions and gastritis.  Started gancyclovir IV and carafate.      Seen and examined at bedside.  She reports significant pain with swallowing, same as yesterday.  Appetite is good, ready to try clear liquid diet.  Felt cold overnight but this is normal for her.  No overt F/C.  Still with persistent abd pain, same as before.  Denies N/V/D/C, dysuria, CP, SOB, cough, headaches.     ROS neg except as per hpi    VITAL SIGNS:  T(F): 98.5 (12-23-17 @ 05:18)  HR: 71 (12-23-17 @ 05:18)  BP: 108/58 (12-23-17 @ 05:18)  RR: 18 (12-23-17 @ 05:18)  SpO2: 96% (12-23-17 @ 05:18)  Wt(kg): --    PHYSICAL EXAM:    Constitutional:  NAD, cachectic  Eyes:  EOMI, PERRL, sclera and conjunctiva clear  ENMT:  MM dry, oropharynx WNL  Neck:  Supple, trachea midline, no JVD or LAD, no thyromegaly  Respiratory:  CTAB no wheezing rhonchi or rales  Cardiovascular:  S1S2 no m/r/g  Gastrointestinal:  NABS.  Soft, nondistended.  Mild TTP to in LUQ and LLQ.  No guarding.  No organomegaly.  Extremities:  No edema.  No cyanosis.    Vascular:  peripheral pulses palpable b/l  Neurological:  Nonfocal.  CN II-XII grossly intact.  Strength 5/5 throughout.    Musculoskeletal:  No atrophy    MEDICATIONS  (STANDING):  acetaminophen  IVPB. 500 milliGRAM(s) IV Intermittent once  ALBUTerol    90 MICROgram(s) HFA Inhaler 1 Puff(s) Inhalation four times a day  atazanavir 300 milliGRAM(s) Oral daily  buDESOnide 160 MICROgram(s)/formoterol 4.5 MICROgram(s) Inhaler 2 Puff(s) Inhalation two times a day  dextrose 5% + sodium chloride 0.9%. 1000 milliLiter(s) (50 mL/Hr) IV Continuous <Continuous>  fluconAZOLE   40 mG/mL Suspension 100 milliGRAM(s) Oral daily  ganciclovir IVPB 80 milliGRAM(s) IV Intermittent every 24 hours  ganciclovir IVPB      heparin  Injectable 5000 Unit(s) SubCutaneous every 12 hours  levETIRAcetam  IVPB 500 milliGRAM(s) IV Intermittent every 12 hours  pantoprazole  Injectable 40 milliGRAM(s) IV Push daily  ritonavir Tablet 100 milliGRAM(s) Oral daily  sucralfate 1 Gram(s) Oral four times a day  trimethoprim  40 mG/sulfamethoxazole 200 mG Suspension 10 milliLiter(s) Oral daily    MEDICATIONS  (PRN):  benzocaine 15 mG/menthol 3.6 mG Lozenge 1 Lozenge Oral every 3 hours PRN Sore Throat  HYDROmorphone  Injectable 0.5 milliGRAM(s) IV Push every 10 minutes PRN Severe Pain (7 - 10)      Allergies    No Known Allergies    Intolerances        LABS:                        9.4    2.7   )-----------( 126      ( 22 Dec 2017 05:45 )             29.5     12-22    138  |  104  |  30<H>  ----------------------------<  88  3.7   |  23  |  1.12    Ca    8.0<L>      22 Dec 2017 05:43  Phos  3.1     12-22  Mg     1.9     12-22    TPro  7.7  /  Alb  3.3  /  TBili  0.3  /  DBili  x   /  AST  16  /  ALT  10  /  AlkPhos  73  12-22          RADIOLOGY & ADDITIONAL TESTS:

## 2017-12-23 NOTE — DIETITIAN INITIAL EVALUATION ADULT. - OTHER INFO
56 yo/female with PMHx HIV/AIDS, IVDA, asthma, HepC, GERD, seizures, admitted with odynophagia, weakness, FTT. Pt is s/p EGD 12/22 revealing esophageal erosions and gastritis. Pt visited in room, awake, alert, very pleasant, family at bedside. Per pt, has lost 25# over the last month d/t dysphagia, pain in throat, resulting in minimal PO intake (some broth during the day). Currently tolerating full liquids; some throat pain endorsed when swallowing but okay so far. Asking for supplement provided to pt by different RD, will find out which one. Complaints of N/V endorsed, no BM for 3 days. Skin intact. Nutrition Focused Physical Exam completed and significant for severe protein-calorie malnutrition.

## 2017-12-23 NOTE — PROGRESS NOTE ADULT - PROBLEM SELECTOR PLAN 2
As per outpatient records, the patient's BMI was 14.08 11/29 and ranged from 16 to 17 in the past few years. She has been prescribed ensure and megestrol, suggesting that this is not a new development. Her recent weight loss may be related to decreased PO intake from odynophagia. Also concern for malignancy  -Nutrition consulted  -Will add ensure

## 2017-12-23 NOTE — PROGRESS NOTE ADULT - PROBLEM SELECTOR PLAN 6
Likely from HIV.  -hgb 9.4 -> 8.8, likely secondary to IVF. Iron studies inconsistent with iron deficiency.  No evidence of GIB based on EGD, but will follow up FOBT.  Rectal exam unremarkable.  -ANC at presentation 1800.  Continue to trend.  -Plt 102k.  Trend.    -Maintain active T&S

## 2017-12-23 NOTE — DISCHARGE NOTE ADULT - CARE PLAN
Principal Discharge DX:	Esophagitis, erosive  Goal:	prevent progression  Instructions for follow-up, activity and diet:	You were diagnosed with erosive esophagitis.  The most likely cause is viral.  You were treated with antivirals.  Continue taking your antiviral medications prescribed at discharge.  Continue taking your HAART and follow up with your PCP within one week.  Return to the ER with any new or worsening symptoms.  Secondary Diagnosis:	AIDS  Instructions for follow-up, activity and diet:	You have HIV/AIDS.  continue taking your HAART as prescribed and follow up with your pcp within one week  Secondary Diagnosis:	SEVERINO (acute kidney injury)  Instructions for follow-up, activity and diet:	You presented with a transient decline in your kidney function.  It was caused by dehydration most likely, as it resolved with IV fluids.  Continue to drink plenty of water.  Secondary Diagnosis:	Nonintractable epilepsy without status epilepticus, unspecified epilepsy type  Instructions for follow-up, activity and diet:	Continue taking your keppra as prescribed.  follow up with your pcp within one week.  Secondary Diagnosis:	Pancytopenia  Instructions for follow-up, activity and diet:	You have a low number of red blood cells, white blood cells, and platelets.  This is most likely due to HIV.  follow up with your pcp within one week.  Secondary Diagnosis:	Polysubstance abuse  Instructions for follow-up, activity and diet:	You should abstain from any recreational drug use or alcohol.  Follow up with your pcp within one week.  Secondary Diagnosis:	Weight loss  Instructions for follow-up, activity and diet:	You are malnourished.  Continue eating 3 meals per day and drink supplemental shakes daily.  Follow up with your pcp within one week. Principal Discharge DX:	Esophagitis, erosive  Goal:	prevent progression  Instructions for follow-up, activity and diet:	You were diagnosed with erosive esophagitis.  The most likely cause is viral.  You were treated with antivirals.  Continue taking your antiviral medications prescribed at discharge.  Continue taking your HAART and follow up with your PCP within one week.  Return to the ER with any new or worsening symptoms.  -Please take Omeprazole 20mg daily and it is important that you take it 12 hours before Reyataz, as these medications can interact and make Reyataz less effective.  Secondary Diagnosis:	AIDS  Instructions for follow-up, activity and diet:	You have HIV/AIDS.  continue taking your HAART as prescribed and follow up with your pcp within one week  -Take Reyataz 300mg once per day  -Take norvir 100mg once per day  -Take Descovy every other day  (stop truvada)  -Take Azithromycin 1200mg once per week (next dose due Jan 2)  -Take Bactrim as prescribed for PCP prophlyaxis  -You have an appointment with Dr. Cornelius on Thursday, December 28th at 2:00pm. It is very important that you go to this appointment for follow up.  Secondary Diagnosis:	SEVERINO (acute kidney injury)  Instructions for follow-up, activity and diet:	You presented with a transient decline in your kidney function.  It was caused by dehydration most likely, as it resolved with IV fluids.  Continue to drink plenty of water.  Secondary Diagnosis:	Nonintractable epilepsy without status epilepticus, unspecified epilepsy type  Instructions for follow-up, activity and diet:	Continue taking your keppra as prescribed.  follow up with your pcp within one week.  Secondary Diagnosis:	Pancytopenia  Instructions for follow-up, activity and diet:	You have a low number of red blood cells, white blood cells, and platelets.  This is most likely due to HIV.  follow up with your pcp within one week.  Secondary Diagnosis:	Polysubstance abuse  Instructions for follow-up, activity and diet:	You should abstain from any recreational drug use or alcohol.  Follow up with your pcp within one week.  Secondary Diagnosis:	Weight loss  Instructions for follow-up, activity and diet:	You are malnourished.  Continue eating 3 meals per day and drink supplemental shakes daily.  Follow up with your pcp within one week. Principal Discharge DX:	Esophagitis, erosive  Goal:	prevent progression  Instructions for follow-up, activity and diet:	You were diagnosed with erosive esophagitis.  The most likely cause is viral.  You were treated with antivirals.  Continue taking your Valtrex as prescribed at discharge.  Continue taking your HAART and follow up with your PCP within one week.  Return to the ER with any new or worsening symptoms.  -Please take Omeprazole 20mg daily and it is important that you take it 12 hours before Reyataz, as these medications can interact and make Reyataz less effective.  Secondary Diagnosis:	AIDS  Instructions for follow-up, activity and diet:	You have HIV/AIDS.  continue taking your HAART as prescribed and follow up with your pcp within one week.  Continue taking your azithromycin and bactrim as prescribed.  You will not need another dose of azithro until January 2nd as your received it on the day of your discharge, and it is a once weekly medication.  -Take Reyataz 300mg once per day  -Take norvir 100mg once per day  -Take Descovy every other day  (stop truvada)  -Take Azithromycin 1200mg once per week (next dose due Jan 2)  -Take Bactrim as prescribed for PCP prophlyaxis  -You have an appointment with Dr. Cornelius on Thursday, December 28th at 2:00pm. It is very important that you go to this appointment for follow up.  Secondary Diagnosis:	SEVERINO (acute kidney injury)  Instructions for follow-up, activity and diet:	You presented with a transient decline in your kidney function.  It was caused by dehydration most likely, as it resolved with IV fluids.  Continue to drink plenty of water.  Secondary Diagnosis:	Nonintractable epilepsy without status epilepticus, unspecified epilepsy type  Instructions for follow-up, activity and diet:	Continue taking your keppra as prescribed.  follow up with your pcp within one week.  Secondary Diagnosis:	Pancytopenia  Instructions for follow-up, activity and diet:	You have a low number of red blood cells, white blood cells, and platelets.  This is most likely due to HIV.  follow up with your pcp within one week.  Secondary Diagnosis:	Polysubstance abuse  Instructions for follow-up, activity and diet:	You should abstain from any recreational drug use or alcohol.  Follow up with your pcp within one week.  Secondary Diagnosis:	Weight loss  Instructions for follow-up, activity and diet:	You are malnourished.  Continue eating 3 meals per day and drink supplemental shakes daily.  Follow up with your pcp within one week.

## 2017-12-23 NOTE — DISCHARGE NOTE ADULT - ADDITIONAL INSTRUCTIONS
Follow up with your pcp within one week.  Continue taking your antiviral medication for erosive esophagitis.  Continue taking your HAART therapy for HIV.  Drink plenty of water and eat 3 large meals daily.  follow up with your pcp within one week. Follow up with your pcp within one week.  Continue taking your antiviral medication Valtrex for erosive esophagitis.  Continue taking your HAART therapy for HIV, as well as your bactrim and azithromycin.  Drink plenty of water and eat 3 large meals daily.  follow up with your pcp within one week.  You have hepatitis C and should follow up with Dr. Cornelius for this as well.

## 2017-12-23 NOTE — DIETITIAN INITIAL EVALUATION ADULT. - PROBLEM SELECTOR PLAN 2
As per outpatient records, the patient's BMI was 14.08 11/29 and ranged from 16 to 17 in the past few years. She has been prescribed ensure and megestrol, suggesting that this is not a new development. Her recent weight loss may be related to decreased PO intake from odynophagia. Also concern for malignancy  -Nutrition consult   -Ensure when no longer NPO

## 2017-12-23 NOTE — PROGRESS NOTE ADULT - SUBJECTIVE AND OBJECTIVE BOX
Pt seen and examined at bedside  No new c/o  Wants to eat solid food  Currently on full liquid and still having odynophagia  Denies n/v/d, abdominal pain, fever, chills      MEDICATIONS:  MEDICATIONS  (STANDING):  acetaminophen  IVPB. 500 milliGRAM(s) IV Intermittent once  ALBUTerol    90 MICROgram(s) HFA Inhaler 1 Puff(s) Inhalation four times a day  atazanavir 300 milliGRAM(s) Oral daily  benzocaine 15 mG/menthol 3.6 mG Lozenge 1 Lozenge Oral every 4 hours  buDESOnide 160 MICROgram(s)/formoterol 4.5 MICROgram(s) Inhaler 2 Puff(s) Inhalation two times a day  dextrose 5% + sodium chloride 0.9%. 1000 milliLiter(s) (50 mL/Hr) IV Continuous <Continuous>  ganciclovir IVPB 80 milliGRAM(s) IV Intermittent every 24 hours  ganciclovir IVPB      heparin  Injectable 5000 Unit(s) SubCutaneous every 12 hours  levETIRAcetam  IVPB 500 milliGRAM(s) IV Intermittent every 12 hours  pantoprazole  Injectable 40 milliGRAM(s) IV Push daily  sucralfate 1 Gram(s) Oral four times a day  trimethoprim  40 mG/sulfamethoxazole 200 mG Suspension 10 milliLiter(s) Oral daily    MEDICATIONS  (PRN):  HYDROmorphone  Injectable 0.5 milliGRAM(s) IV Push every 10 minutes PRN Severe Pain (7 - 10)      Allergies  No Known Allergies    Intolerances      Vital Signs Last 24 Hrs  T(C): 36.9 (23 Dec 2017 09:44), Max: 37.2 (22 Dec 2017 22:09)  T(F): 98.4 (23 Dec 2017 09:44), Max: 98.9 (22 Dec 2017 22:09)  HR: 65 (23 Dec 2017 09:44) (65 - 90)  BP: 112/64 (23 Dec 2017 09:44) (94/51 - 135/77)  BP(mean): 99 (22 Dec 2017 19:45) (62 - 99)  RR: 16 (23 Dec 2017 09:44) (9 - 25)  SpO2: 98% (23 Dec 2017 09:44) (96% - 100%)    12-22 @ 07:01  -  12-23 @ 07:00  --------------------------------------------------------  IN: 600 mL / OUT: 0 mL / NET: 600 mL    12-23 @ 07:01  -  12-23 @ 15:47  --------------------------------------------------------  IN: 50 mL / OUT: 0 mL / NET: 50 mL      PHYSICAL EXAM:  GEN - middle aged F looking older than stated age, lying in bed in no distress, anicteric, afebrile, not pale  Gastrointestinal: full, soft, BS+, NT, NR, NG, no masses or organs palpated  Extremities: no edema  Neurological: AAOx3 non focal    LABS:  CBC Full  -  ( 23 Dec 2017 07:10 )  WBC Count : 2.1 K/uL  Hemoglobin : 8.8 g/dL  Hematocrit : 27.7 %  Platelet Count - Automated : 104 K/uL  Mean Cell Volume : 81.2 fL  Mean Cell Hemoglobin : 25.8 pg  Mean Cell Hemoglobin Concentration : 31.8 g/dL    140  |  106  |  20  ----------------------------<  163<H>  3.2<L>   |  20<L>  |  1.08    Ca    7.8<L>      23 Dec 2017 07:10  Phos  3.1     12-22  Mg     1.8     12-23    TPro  7.7  /  Alb  3.3  /  TBili  0.3  /  DBili  x   /  AST  16  /  ALT  10  /  AlkPhos  73  12-22          RADIOLOGY & ADDITIONAL STUDIES (The following images were personally reviewed):

## 2017-12-23 NOTE — DISCHARGE NOTE ADULT - CARE PROVIDER_API CALL
Aditi Cornelius (MD), Internal Medicine  [address] [address]  Phone: [phone]  Fax: [phone]

## 2017-12-23 NOTE — DIETITIAN INITIAL EVALUATION ADULT. - PROBLEM SELECTOR PLAN 3
Known to Dr Cornelius but often misses appointments. Last work up from 7/2017 CD4: 80, VL: 74,424.  -C/w truvada, norvir (should it be BID?), reyatax and bactrim for ppx  -Stressed importance of routine follow up  -HIV consult

## 2017-12-23 NOTE — DISCHARGE NOTE ADULT - MEDICATION SUMMARY - MEDICATIONS TO STOP TAKING
I will STOP taking the medications listed below when I get home from the hospital:    Truvada 200 mg-300 mg oral tablet  -- 1 tab(s) by mouth once a day

## 2017-12-23 NOTE — DIETITIAN INITIAL EVALUATION ADULT. - NUTRITIONGOAL OUTCOME1
Pt will have 75% or greater PO intake per meal plus ONS with +tolerance, pt will have no more wt los

## 2017-12-23 NOTE — DIETITIAN INITIAL EVALUATION ADULT. - ENERGY NEEDS
Height 64"; ABW 73.6#; #; 61%IBW  BMI 12.6  Ideal body weight used for calculations as pt <80% of IBW. Needs estimated 2/2 severe malnutrition, HIV/AIDS.

## 2017-12-23 NOTE — DISCHARGE NOTE ADULT - PATIENT PORTAL LINK FT
“You can access the [facility] Patient Portal, offered by [facility], by registering with the following website: [email or web address]”

## 2017-12-23 NOTE — DISCHARGE NOTE ADULT - PLAN OF CARE
prevent progression You were diagnosed with erosive esophagitis.  The most likely cause is viral.  You were treated with antivirals.  Continue taking your antiviral medications prescribed at discharge.  Continue taking your HAART and follow up with your PCP within one week.  Return to the ER with any new or worsening symptoms. You have HIV/AIDS.  continue taking your HAART as prescribed and follow up with your pcp within one week You presented with a transient decline in your kidney function.  It was caused by dehydration most likely, as it resolved with IV fluids.  Continue to drink plenty of water. Continue taking your keppra as prescribed.  follow up with your pcp within one week. You have a low number of red blood cells, white blood cells, and platelets.  This is most likely due to HIV.  follow up with your pcp within one week. You should abstain from any recreational drug use or alcohol.  Follow up with your pcp within one week. You are malnourished.  Continue eating 3 meals per day and drink supplemental shakes daily.  Follow up with your pcp within one week. You were diagnosed with erosive esophagitis.  The most likely cause is viral.  You were treated with antivirals.  Continue taking your antiviral medications prescribed at discharge.  Continue taking your HAART and follow up with your PCP within one week.  Return to the ER with any new or worsening symptoms.  -Please take Omeprazole 20mg daily and it is important that you take it 12 hours before Reyataz, as these medications can interact and make Reyataz less effective. You have HIV/AIDS.  continue taking your HAART as prescribed and follow up with your pcp within one week  -Take Reyataz 300mg once per day  -Take norvir 100mg once per day  -Take Descovy every other day  (stop truvada)  -Take Azithromycin 1200mg once per week (next dose due Jan 2)  -Take Bactrim as prescribed for PCP prophlyaxis  -You have an appointment with Dr. Cornelius on Thursday, December 28th at 2:00pm. It is very important that you go to this appointment for follow up. You were diagnosed with erosive esophagitis.  The most likely cause is viral.  You were treated with antivirals.  Continue taking your Valtrex as prescribed at discharge.  Continue taking your HAART and follow up with your PCP within one week.  Return to the ER with any new or worsening symptoms.  -Please take Omeprazole 20mg daily and it is important that you take it 12 hours before Reyataz, as these medications can interact and make Reyataz less effective. You have HIV/AIDS.  continue taking your HAART as prescribed and follow up with your pcp within one week.  Continue taking your azithromycin and bactrim as prescribed.  You will not need another dose of azithro until January 2nd as your received it on the day of your discharge, and it is a once weekly medication.  -Take Reyataz 300mg once per day  -Take norvir 100mg once per day  -Take Descovy every other day  (stop truvada)  -Take Azithromycin 1200mg once per week (next dose due Jan 2)  -Take Bactrim as prescribed for PCP prophlyaxis  -You have an appointment with Dr. Cornelius on Thursday, December 28th at 2:00pm. It is very important that you go to this appointment for follow up.

## 2017-12-23 NOTE — DIETITIAN INITIAL EVALUATION ADULT. - PROBLEM SELECTOR PLAN 1
Etiology includes esophageal candidiasis vs CMV vs obstruction from malignancy in setting of weight loss vs stricture vs achalasia. Patient was easily able to swallow tea in the ED but may require additional GI work up. She has poor compliance with her outpatient appointments and will likely to be lost to follow up, so inpatient assessment may be warranted  -Will treat candida with diflucan   -IVF  -GI consult: EGD  -Pepcid

## 2017-12-23 NOTE — CHART NOTE - NSCHARTNOTEFT_GEN_A_CORE
Upon Nutritional Assessment by the Registered Dietitian your patient was determined to meet criteria / has evidence of the following diagnosis/diagnoses:          [ ]  Mild Protein Calorie Malnutrition        [ ]  Moderate Protein Calorie Malnutrition        [X ] Severe Protein Calorie Malnutrition        [ ] Unspecified Protein Calorie Malnutrition        [ X] Underweight / BMI <19        [ ] Morbid Obesity / BMI > 40      Findings as based on:  •  Comprehensive nutrition assessment and consultation  •  Calorie counts (nutrient intake analysis)  •  Food acceptance and intake status from observations by staff  •  Follow up  •  Patient education  •  Intervention secondary to interdisciplinary rounds  •   concerns    BMI 12.6    Nutrition focused physical exam completed revealing severe protein calorie malnutrition as evidenced by muscle loss indicated by scooping of temporal region, protruding clavicle and acromion bone process, loss of subcutaneous fat indicated by hollowing of orbital region, inadequate energy intake of <75% for >1 month, and significant weight loss of >5% for 1 month.     Treatment:    The following diet has been recommended:  As medically feasible, advance to SOFT, REGULAR diet with Ensure Enlive TID (1050 kcal, 60g protein, 540mL free H2O) and ProStat Sugar Free BID (200 kcal, 30g protein)       PROVIDER Section:     By signing this assessment you are acknowledging and agree with the diagnosis/diagnoses assigned by the Registered Dietitian    Comments:

## 2017-12-23 NOTE — PROGRESS NOTE ADULT - PROBLEM SELECTOR PLAN 3
Known to Dr Cornelius but often misses appointments. Last work up from 7/2017   -VL >90k.  CD4 pending.  Will add azithro if CD4 <50.  C/w bactrim.   -C/w HAART. Descovy, norvir, reyatax   -Stressed importance of routine follow up  -HIV team consulted

## 2017-12-23 NOTE — DISCHARGE NOTE ADULT - MEDICATION SUMMARY - MEDICATIONS TO TAKE
I will START or STAY ON the medications listed below when I get home from the hospital:    Keppra 500 mg oral tablet  -- 1 tab(s) by mouth 2 times a day  -- Indication: For Seizures    promethazine 12.5 mg oral tablet  -- 1 tab(s) by mouth every 8 hours MDD:3 tabs max daily  -- May cause drowsiness.  Alcohol may intensify this effect.  Use care when operating dangerous machinery.  Obtain medical advice before taking any non-prescription drugs as some may affect the action of this medication.    -- Indication: For Nausea    valACYclovir 1 g oral tablet  -- 1 tab(s) by mouth every 12 hours MDD:2 tabs daily  -- Indication: For Esophagitis, erosive    Reyataz 300 mg oral capsule  -- 1 cap(s) by mouth once a day  -- Indication: For HIV (human immunodeficiency virus infection)    Norvir 100 mg oral capsule  -- 1 cap(s) by mouth once a day  -- Indication: For HIV (human immunodeficiency virus infection)    Descovy 200 mg-25 mg oral tablet  -- 1 tab(s) by mouth every other day   -- Indication: For HIV (human immunodeficiency virus infection)    Advair  mcg-21 mcg/inh inhalation aerosol  -- 2 puff(s) inhaled 2 times a day  -- Indication: For COPD    ProAir HFA 90 mcg/inh inhalation aerosol  -- 2 puff(s) inhaled 4 times a day  -- Indication: For COPD    azithromycin 600 mg oral tablet  -- 2 tab(s) by mouth once a week  -- Indication: For HIV (human immunodeficiency virus infection)    omeprazole 20 mg oral delayed release capsule  -- 1 cap(s) by mouth once a day (take 12 hours before Reyataz)  -- It is very important that you take or use this exactly as directed.  Do not skip doses or discontinue unless directed by your doctor.  Obtain medical advice before taking any non-prescription drugs as some may affect the action of this medication.  Swallow whole.  Do not crush.    -- Indication: For GASTRITIS    Bactrim  -- Indication: For HIV (human immunodeficiency virus infection)    sulfamethoxazole-trimethoprim 200 mg-40 mg/5 mL oral suspension  -- 10 milliliter(s) by mouth once a day (at bedtime)  -- Indication: For HIV (human immunodeficiency virus infection)

## 2017-12-23 NOTE — PROGRESS NOTE ADULT - PROBLEM SELECTOR PLAN 1
-In the setting of AIDS.  Presented with odynophagia, EGD showing erosive esophagitis c/w CMV vs HSV esophagitis.  GI recc gancyclovir IV.  -fluconazole discontinued.  -Patient tolerating liquids but still with significant pain.  c/w lozenges and tylenol.    -f/u CD4  -EGD also showing gastritis.  Added carafate.    -Follow up GI reccs regarding length of gancyclovir treatment

## 2017-12-24 LAB
ALBUMIN SERPL ELPH-MCNC: 2.3 G/DL — LOW (ref 3.3–5)
ALP SERPL-CCNC: 63 U/L — SIGNIFICANT CHANGE UP (ref 40–120)
ALT FLD-CCNC: 7 U/L — LOW (ref 10–45)
ANION GAP SERPL CALC-SCNC: 11 MMOL/L — SIGNIFICANT CHANGE UP (ref 5–17)
AST SERPL-CCNC: 9 U/L — LOW (ref 10–40)
BILIRUB SERPL-MCNC: 0.6 MG/DL — SIGNIFICANT CHANGE UP (ref 0.2–1.2)
BUN SERPL-MCNC: 16 MG/DL — SIGNIFICANT CHANGE UP (ref 7–23)
CALCIUM SERPL-MCNC: 7.9 MG/DL — LOW (ref 8.4–10.5)
CHLORIDE SERPL-SCNC: 109 MMOL/L — HIGH (ref 96–108)
CO2 SERPL-SCNC: 21 MMOL/L — LOW (ref 22–31)
CREAT SERPL-MCNC: 1.11 MG/DL — SIGNIFICANT CHANGE UP (ref 0.5–1.3)
GLUCOSE SERPL-MCNC: 131 MG/DL — HIGH (ref 70–99)
HCT VFR BLD CALC: 27.4 % — LOW (ref 34.5–45)
HGB BLD-MCNC: 8.7 G/DL — LOW (ref 11.5–15.5)
MAGNESIUM SERPL-MCNC: 2 MG/DL — SIGNIFICANT CHANGE UP (ref 1.6–2.6)
MCHC RBC-ENTMCNC: 26.3 PG — LOW (ref 27–34)
MCHC RBC-ENTMCNC: 31.8 G/DL — LOW (ref 32–36)
MCV RBC AUTO: 82.8 FL — SIGNIFICANT CHANGE UP (ref 80–100)
PLATELET # BLD AUTO: 111 K/UL — LOW (ref 150–400)
POTASSIUM SERPL-MCNC: 3.8 MMOL/L — SIGNIFICANT CHANGE UP (ref 3.5–5.3)
POTASSIUM SERPL-SCNC: 3.8 MMOL/L — SIGNIFICANT CHANGE UP (ref 3.5–5.3)
PROT SERPL-MCNC: 5.7 G/DL — LOW (ref 6–8.3)
RBC # BLD: 3.31 M/UL — LOW (ref 3.8–5.2)
RBC # FLD: 14.3 % — SIGNIFICANT CHANGE UP (ref 10.3–16.9)
SODIUM SERPL-SCNC: 141 MMOL/L — SIGNIFICANT CHANGE UP (ref 135–145)
WBC # BLD: 1.7 K/UL — LOW (ref 3.8–10.5)
WBC # FLD AUTO: 1.7 K/UL — LOW (ref 3.8–10.5)

## 2017-12-24 PROCEDURE — 99232 SBSQ HOSP IP/OBS MODERATE 35: CPT

## 2017-12-24 RX ORDER — POTASSIUM CHLORIDE 20 MEQ
20 PACKET (EA) ORAL ONCE
Qty: 0 | Refills: 0 | Status: COMPLETED | OUTPATIENT
Start: 2017-12-24 | End: 2017-12-24

## 2017-12-24 RX ORDER — LANOLIN ALCOHOL/MO/W.PET/CERES
3 CREAM (GRAM) TOPICAL AT BEDTIME
Qty: 0 | Refills: 0 | Status: DISCONTINUED | OUTPATIENT
Start: 2017-12-24 | End: 2017-12-26

## 2017-12-24 RX ORDER — ACETAMINOPHEN 500 MG
500 TABLET ORAL ONCE
Qty: 0 | Refills: 0 | Status: COMPLETED | OUTPATIENT
Start: 2017-12-24 | End: 2017-12-24

## 2017-12-24 RX ADMIN — BUDESONIDE AND FORMOTEROL FUMARATE DIHYDRATE 2 PUFF(S): 160; 4.5 AEROSOL RESPIRATORY (INHALATION) at 21:17

## 2017-12-24 RX ADMIN — SIMETHICONE 80 MILLIGRAM(S): 80 TABLET, CHEWABLE ORAL at 17:39

## 2017-12-24 RX ADMIN — Medication 500 MILLIGRAM(S): at 01:17

## 2017-12-24 RX ADMIN — SIMETHICONE 80 MILLIGRAM(S): 80 TABLET, CHEWABLE ORAL at 11:26

## 2017-12-24 RX ADMIN — ALBUTEROL 1 PUFF(S): 90 AEROSOL, METERED ORAL at 23:34

## 2017-12-24 RX ADMIN — ALBUTEROL 1 PUFF(S): 90 AEROSOL, METERED ORAL at 07:20

## 2017-12-24 RX ADMIN — BENZOCAINE AND MENTHOL 1 LOZENGE: 5; 1 LIQUID ORAL at 13:08

## 2017-12-24 RX ADMIN — Medication 1 GRAM(S): at 07:20

## 2017-12-24 RX ADMIN — BENZOCAINE AND MENTHOL 1 LOZENGE: 5; 1 LIQUID ORAL at 10:20

## 2017-12-24 RX ADMIN — ATAZANAVIR 300 MILLIGRAM(S): 200 CAPSULE ORAL at 11:26

## 2017-12-24 RX ADMIN — RITONAVIR 100 MILLIGRAM(S): 100 TABLET, FILM COATED ORAL at 11:27

## 2017-12-24 RX ADMIN — EMTRICITABINE AND TENOFOVIR DISOPROXIL FUMARATE 1 TABLET(S): 200; 300 TABLET, FILM COATED ORAL at 10:20

## 2017-12-24 RX ADMIN — Medication 3 MILLIGRAM(S): at 21:17

## 2017-12-24 RX ADMIN — VALACYCLOVIR 1000 MILLIGRAM(S): 500 TABLET, FILM COATED ORAL at 17:40

## 2017-12-24 RX ADMIN — PANTOPRAZOLE SODIUM 40 MILLIGRAM(S): 20 TABLET, DELAYED RELEASE ORAL at 11:26

## 2017-12-24 RX ADMIN — VALACYCLOVIR 1000 MILLIGRAM(S): 500 TABLET, FILM COATED ORAL at 08:08

## 2017-12-24 RX ADMIN — BENZOCAINE AND MENTHOL 1 LOZENGE: 5; 1 LIQUID ORAL at 07:20

## 2017-12-24 RX ADMIN — Medication 1 GRAM(S): at 11:26

## 2017-12-24 RX ADMIN — HEPARIN SODIUM 5000 UNIT(S): 5000 INJECTION INTRAVENOUS; SUBCUTANEOUS at 17:40

## 2017-12-24 RX ADMIN — BENZOCAINE AND MENTHOL 1 LOZENGE: 5; 1 LIQUID ORAL at 17:39

## 2017-12-24 RX ADMIN — HEPARIN SODIUM 5000 UNIT(S): 5000 INJECTION INTRAVENOUS; SUBCUTANEOUS at 07:20

## 2017-12-24 RX ADMIN — LEVETIRACETAM 420 MILLIGRAM(S): 250 TABLET, FILM COATED ORAL at 17:39

## 2017-12-24 RX ADMIN — Medication 10 MILLILITER(S): at 13:08

## 2017-12-24 RX ADMIN — SODIUM CHLORIDE 50 MILLILITER(S): 9 INJECTION, SOLUTION INTRAVENOUS at 07:21

## 2017-12-24 RX ADMIN — BUDESONIDE AND FORMOTEROL FUMARATE DIHYDRATE 2 PUFF(S): 160; 4.5 AEROSOL RESPIRATORY (INHALATION) at 10:19

## 2017-12-24 RX ADMIN — Medication 1 GRAM(S): at 23:34

## 2017-12-24 RX ADMIN — Medication 1 GRAM(S): at 17:40

## 2017-12-24 RX ADMIN — Medication 20 MILLIEQUIVALENT(S): at 11:26

## 2017-12-24 RX ADMIN — ALBUTEROL 1 PUFF(S): 90 AEROSOL, METERED ORAL at 17:39

## 2017-12-24 RX ADMIN — BENZOCAINE AND MENTHOL 1 LOZENGE: 5; 1 LIQUID ORAL at 21:17

## 2017-12-24 RX ADMIN — ALBUTEROL 1 PUFF(S): 90 AEROSOL, METERED ORAL at 10:20

## 2017-12-24 RX ADMIN — Medication 500 MILLIGRAM(S): at 12:00

## 2017-12-24 RX ADMIN — LEVETIRACETAM 420 MILLIGRAM(S): 250 TABLET, FILM COATED ORAL at 07:20

## 2017-12-24 RX ADMIN — Medication 200 MILLIGRAM(S): at 11:26

## 2017-12-24 NOTE — PROGRESS NOTE ADULT - PROBLEM SELECTOR PLAN 2
h/o OIs; h/o rx non-compliance; HIV team consulted,   HIV RNA VL - 92,712  - CD4 count pending  c/w HAART thx  Will add azithro if CD4 <50.  C/w bactrim.   -C/w HAART. Descovy, norvir, reyatax

## 2017-12-24 NOTE — PROGRESS NOTE ADULT - SUBJECTIVE AND OBJECTIVE BOX
Patient is a 57y old  Female who presents with a chief complaint of odynophagia (23 Dec 2017 19:42)      INTERVAL HPI/OVERNIGHT EVENTS:    Review of Systems: 12 point review of systems otherwise negative  ( - )fevers/chills  ( - ) dyspnea  ( - ) cough  ( - ) chest pain  ( - ) palpatations  ( - ) dizziness/lightheadedness  ( - ) nausea/vomiting  ( - ) abd pain  ( - ) diarrhea  ( - ) melena  ( - ) hematochezia  ( - ) dysuria  ( - ) hematuria  ( - ) leg swelling  ( -) calf tenderness  ( - ) motor weakness  ( - ) extremity numbness  ( - ) back pain  ( + ) tolerating POs  ( + ) BM    MEDICATIONS  (STANDING):  ALBUTerol    90 MICROgram(s) HFA Inhaler 1 Puff(s) Inhalation four times a day  atazanavir 300 milliGRAM(s) Oral daily  benzocaine 15 mG/menthol 3.6 mG Lozenge 1 Lozenge Oral every 4 hours  buDESOnide 160 MICROgram(s)/formoterol 4.5 MICROgram(s) Inhaler 2 Puff(s) Inhalation two times a day  dextrose 5% + sodium chloride 0.9%. 1000 milliLiter(s) (50 mL/Hr) IV Continuous <Continuous>  emtricitabine 200 mG/tenofovir alafenamide 25 mG (DESCOVY) Tablet 1 Tablet(s) Oral <User Schedule>  heparin  Injectable 5000 Unit(s) SubCutaneous every 12 hours  levETIRAcetam  IVPB 500 milliGRAM(s) IV Intermittent every 12 hours  pantoprazole  Injectable 40 milliGRAM(s) IV Push daily  ritonavir  Solution 100 milliGRAM(s) Oral daily  simethicone drops 80 milliGRAM(s) Oral every 12 hours  sucralfate 1 Gram(s) Oral four times a day  trimethoprim  40 mG/sulfamethoxazole 200 mG Suspension 10 milliLiter(s) Oral daily  valACYclovir 1000 milliGRAM(s) Oral every 12 hours    MEDICATIONS  (PRN):  HYDROmorphone  Injectable 0.5 milliGRAM(s) IV Push every 10 minutes PRN Severe Pain (7 - 10)      Allergies    No Known Allergies    Intolerances          Vital Signs Last 24 Hrs  T(C): 36.8 (24 Dec 2017 16:44), Max: 36.8 (24 Dec 2017 04:58)  T(F): 98.3 (24 Dec 2017 16:44), Max: 98.3 (24 Dec 2017 16:44)  HR: 80 (24 Dec 2017 16:44) (66 - 80)  BP: 114/74 (24 Dec 2017 16:44) (111/68 - 118/87)  BP(mean): --  RR: 16 (24 Dec 2017 16:44) (16 - 17)  SpO2: 95% (24 Dec 2017 16:44) (95% - 99%)  CAPILLARY BLOOD GLUCOSE          12-23 @ 07:01  -  12-24 @ 07:00  --------------------------------------------------------  IN: 1750 mL / OUT: 0 mL / NET: 1750 mL        Physical Exam:    Daily     Daily   General:  Well appearing, NAD, not cachetic  HEENT:  Nonicteric, PERRLA  CV:  RRR, no murmur, no JVD  Lungs:  CTA B/L, no wheezes, rales, rhonchi  Abdomen:  Soft, non-tender, no distended, positive BS, no hepatosplenomegaly  Extremities:  2+ pulses, no c/c, no edema  Skin:  Warm and dry, no rashes  :  No monahan  Neuro:  AAOx3, non-focal, CN II-XII grossly intact  No Restraints    LABS:                        8.7    1.7   )-----------( 111      ( 24 Dec 2017 06:30 )             27.4     12-24    141  |  109<H>  |  16  ----------------------------<  131<H>  3.8   |  21<L>  |  1.11    Ca    7.9<L>      24 Dec 2017 06:30  Mg     2.0     12-24    TPro  5.7<L>  /  Alb  2.3<L>  /  TBili  0.6  /  DBili  x   /  AST  9<L>  /  ALT  7<L>  /  AlkPhos  63  12-24            RADIOLOGY & ADDITIONAL TESTS:    ---------------------------------------------------------------------------  I personally reviewed: [  ]EKG   [  ]CXR    [  ] CT    [  ]Other  ---------------------------------------------------------------------------  PLEASE CHECK WHEN PRESENT:     [  ]Heart Failure     [  ] Acute     [  ] Acute on Chronic     [  ] Chronic  -------------------------------------------------------------------     [  ]Diastolic [HFpEF]     [  ]Systolic [HFrEF]     [  ]Combined [HFpEF & HFrEF]     [  ]Other:  -------------------------------------------------------------------  [  ]SEVERINO     [  ]ATN     [  ]Reneal Medullary Necrosis     [  ]Renal Cortical Necrosis     [  ]Other Pathological Lesions:    [  ]CKD 1  [  ]CKD 2  [  ]CKD 3  [  ]CKD 4  [  ]CKD 5  [  ]Other  -------------------------------------------------------------------  [  ]Other/Unspecified:    --------------------------------------------------------------------    Abdominal Nutritional Status  [  ]Malnutrition: See Nutrition Note  [x  ]Cachexia  [  ]Other:   [  ]Supplement Ordered:  [  ]Morbid Obesity (BMI >=40]

## 2017-12-25 LAB
ANION GAP SERPL CALC-SCNC: 8 MMOL/L — SIGNIFICANT CHANGE UP (ref 5–17)
APTT BLD: 26.9 SEC — LOW (ref 27.5–37.4)
BASOPHILS NFR BLD AUTO: 0 % — SIGNIFICANT CHANGE UP (ref 0–2)
BUN SERPL-MCNC: 14 MG/DL — SIGNIFICANT CHANGE UP (ref 7–23)
CALCIUM SERPL-MCNC: 8 MG/DL — LOW (ref 8.4–10.5)
CHLORIDE SERPL-SCNC: 104 MMOL/L — SIGNIFICANT CHANGE UP (ref 96–108)
CO2 SERPL-SCNC: 23 MMOL/L — SIGNIFICANT CHANGE UP (ref 22–31)
CREAT SERPL-MCNC: 1 MG/DL — SIGNIFICANT CHANGE UP (ref 0.5–1.3)
EOSINOPHIL NFR BLD AUTO: 0.5 % — SIGNIFICANT CHANGE UP (ref 0–6)
GLUCOSE SERPL-MCNC: 110 MG/DL — HIGH (ref 70–99)
HCT VFR BLD CALC: 28.5 % — LOW (ref 34.5–45)
HGB BLD-MCNC: 9.2 G/DL — LOW (ref 11.5–15.5)
INR BLD: 0.95 — SIGNIFICANT CHANGE UP (ref 0.88–1.16)
LYMPHOCYTES # BLD AUTO: 39.8 % — SIGNIFICANT CHANGE UP (ref 13–44)
MAGNESIUM SERPL-MCNC: 1.8 MG/DL — SIGNIFICANT CHANGE UP (ref 1.6–2.6)
MCHC RBC-ENTMCNC: 26 PG — LOW (ref 27–34)
MCHC RBC-ENTMCNC: 32.3 G/DL — SIGNIFICANT CHANGE UP (ref 32–36)
MCV RBC AUTO: 80.5 FL — SIGNIFICANT CHANGE UP (ref 80–100)
MONOCYTES NFR BLD AUTO: 5.9 % — SIGNIFICANT CHANGE UP (ref 2–14)
NEUTROPHILS NFR BLD AUTO: 53.8 % — SIGNIFICANT CHANGE UP (ref 43–77)
PLATELET # BLD AUTO: 120 K/UL — LOW (ref 150–400)
POTASSIUM SERPL-MCNC: 3.6 MMOL/L — SIGNIFICANT CHANGE UP (ref 3.5–5.3)
POTASSIUM SERPL-SCNC: 3.6 MMOL/L — SIGNIFICANT CHANGE UP (ref 3.5–5.3)
PROTHROM AB SERPL-ACNC: 10.5 SEC — SIGNIFICANT CHANGE UP (ref 9.8–12.7)
RBC # BLD: 3.54 M/UL — LOW (ref 3.8–5.2)
RBC # FLD: 14.4 % — SIGNIFICANT CHANGE UP (ref 10.3–16.9)
SODIUM SERPL-SCNC: 135 MMOL/L — SIGNIFICANT CHANGE UP (ref 135–145)
WBC # BLD: 2.2 K/UL — LOW (ref 3.8–10.5)
WBC # FLD AUTO: 2.2 K/UL — LOW (ref 3.8–10.5)

## 2017-12-25 PROCEDURE — 76700 US EXAM ABDOM COMPLETE: CPT | Mod: 26

## 2017-12-25 PROCEDURE — 99232 SBSQ HOSP IP/OBS MODERATE 35: CPT

## 2017-12-25 RX ORDER — POTASSIUM CHLORIDE 20 MEQ
40 PACKET (EA) ORAL ONCE
Qty: 0 | Refills: 0 | Status: COMPLETED | OUTPATIENT
Start: 2017-12-25 | End: 2017-12-25

## 2017-12-25 RX ORDER — PANTOPRAZOLE SODIUM 20 MG/1
40 TABLET, DELAYED RELEASE ORAL
Qty: 0 | Refills: 0 | Status: DISCONTINUED | OUTPATIENT
Start: 2017-12-25 | End: 2017-12-26

## 2017-12-25 RX ORDER — MAGNESIUM SULFATE 500 MG/ML
1 VIAL (ML) INJECTION ONCE
Qty: 0 | Refills: 0 | Status: COMPLETED | OUTPATIENT
Start: 2017-12-25 | End: 2017-12-25

## 2017-12-25 RX ORDER — ACETAMINOPHEN 500 MG
500 TABLET ORAL ONCE
Qty: 0 | Refills: 0 | Status: DISCONTINUED | OUTPATIENT
Start: 2017-12-25 | End: 2017-12-25

## 2017-12-25 RX ORDER — ACETAMINOPHEN 500 MG
500 TABLET ORAL ONCE
Qty: 0 | Refills: 0 | Status: COMPLETED | OUTPATIENT
Start: 2017-12-25 | End: 2017-12-25

## 2017-12-25 RX ADMIN — LEVETIRACETAM 420 MILLIGRAM(S): 250 TABLET, FILM COATED ORAL at 18:42

## 2017-12-25 RX ADMIN — Medication 10 MILLILITER(S): at 14:12

## 2017-12-25 RX ADMIN — SIMETHICONE 80 MILLIGRAM(S): 80 TABLET, CHEWABLE ORAL at 07:32

## 2017-12-25 RX ADMIN — RITONAVIR 100 MILLIGRAM(S): 100 TABLET, FILM COATED ORAL at 14:12

## 2017-12-25 RX ADMIN — Medication 40 MILLIEQUIVALENT(S): at 14:13

## 2017-12-25 RX ADMIN — Medication 3 MILLIGRAM(S): at 21:54

## 2017-12-25 RX ADMIN — Medication 1 GRAM(S): at 07:31

## 2017-12-25 RX ADMIN — VALACYCLOVIR 1000 MILLIGRAM(S): 500 TABLET, FILM COATED ORAL at 07:31

## 2017-12-25 RX ADMIN — HEPARIN SODIUM 5000 UNIT(S): 5000 INJECTION INTRAVENOUS; SUBCUTANEOUS at 18:18

## 2017-12-25 RX ADMIN — Medication 1 GRAM(S): at 18:42

## 2017-12-25 RX ADMIN — PANTOPRAZOLE SODIUM 40 MILLIGRAM(S): 20 TABLET, DELAYED RELEASE ORAL at 18:19

## 2017-12-25 RX ADMIN — LEVETIRACETAM 420 MILLIGRAM(S): 250 TABLET, FILM COATED ORAL at 07:30

## 2017-12-25 RX ADMIN — BUDESONIDE AND FORMOTEROL FUMARATE DIHYDRATE 2 PUFF(S): 160; 4.5 AEROSOL RESPIRATORY (INHALATION) at 10:25

## 2017-12-25 RX ADMIN — BENZOCAINE AND MENTHOL 1 LOZENGE: 5; 1 LIQUID ORAL at 21:54

## 2017-12-25 RX ADMIN — ALBUTEROL 1 PUFF(S): 90 AEROSOL, METERED ORAL at 18:17

## 2017-12-25 RX ADMIN — BUDESONIDE AND FORMOTEROL FUMARATE DIHYDRATE 2 PUFF(S): 160; 4.5 AEROSOL RESPIRATORY (INHALATION) at 21:54

## 2017-12-25 RX ADMIN — ALBUTEROL 1 PUFF(S): 90 AEROSOL, METERED ORAL at 10:25

## 2017-12-25 RX ADMIN — HEPARIN SODIUM 5000 UNIT(S): 5000 INJECTION INTRAVENOUS; SUBCUTANEOUS at 07:31

## 2017-12-25 RX ADMIN — Medication 1 GRAM(S): at 14:15

## 2017-12-25 RX ADMIN — BENZOCAINE AND MENTHOL 1 LOZENGE: 5; 1 LIQUID ORAL at 07:34

## 2017-12-25 RX ADMIN — BENZOCAINE AND MENTHOL 1 LOZENGE: 5; 1 LIQUID ORAL at 18:18

## 2017-12-25 RX ADMIN — Medication 100 GRAM(S): at 14:12

## 2017-12-25 RX ADMIN — ATAZANAVIR 300 MILLIGRAM(S): 200 CAPSULE ORAL at 14:14

## 2017-12-25 RX ADMIN — BENZOCAINE AND MENTHOL 1 LOZENGE: 5; 1 LIQUID ORAL at 14:16

## 2017-12-25 RX ADMIN — SIMETHICONE 80 MILLIGRAM(S): 80 TABLET, CHEWABLE ORAL at 18:17

## 2017-12-25 RX ADMIN — BENZOCAINE AND MENTHOL 1 LOZENGE: 5; 1 LIQUID ORAL at 10:25

## 2017-12-25 RX ADMIN — ALBUTEROL 1 PUFF(S): 90 AEROSOL, METERED ORAL at 07:28

## 2017-12-25 RX ADMIN — VALACYCLOVIR 1000 MILLIGRAM(S): 500 TABLET, FILM COATED ORAL at 18:18

## 2017-12-25 NOTE — PROGRESS NOTE ADULT - PROBLEM SELECTOR PLAN 1
odynophagia improving. able to tolerate po medications and food  EGD showing erosive esophagitis c/w CMV vs HSV esophagitis.    - follow up pathology  - continue PPI BID  - start Carafate solution 4x/day  - start valacyclovir 1g BID

## 2017-12-25 NOTE — PROGRESS NOTE ADULT - PROBLEM SELECTOR PLAN 7
Reports using heroin day PTA  -Utox positive for methadone, benzos and cocaine.    -avoid opiates, monitor for signs of benzo withdrawal  -f/u hep c VL

## 2017-12-25 NOTE — PROGRESS NOTE ADULT - PROBLEM SELECTOR PLAN 4
patient with very high viral load  -f/u abdominal US with doppler to evaluate for cirhosis  -tylenol PRN discontinued  -will need outpatient GI/hepatology follow up

## 2017-12-25 NOTE — PROGRESS NOTE ADULT - PROBLEM SELECTOR PLAN 6
Likely from HIV.  -Hgb stable 8-9.  Iron studies inconsistent with iron deficiency.  No evidence of GIB based on EGD, but will follow up FOBT.  Rectal exam unremarkable.  -Plt 102k.  Trend.    -Maintain active T&S

## 2017-12-25 NOTE — PROGRESS NOTE ADULT - PROBLEM/PLAN-7
Detailed message left on answering machine re back pain and PRednison.  Dr. Stu Delacruz did prescribe Prednisone. Recommended exercises and f/u post Prednisone needed.    DISPLAY PLAN FREE TEXT

## 2017-12-25 NOTE — PROGRESS NOTE ADULT - SUBJECTIVE AND OBJECTIVE BOX
INTERVAL HPI/OVERNIGHT EVENTS:  No acute overnight events.  Reports persistent pain in throat, same as before.  Was unable to tolerate mechanical soft diet yesterday.  She feels slightly nauseous but is not vomiting.  (+) chills. Has not had a BM in at least 2 days, but attributes this to poor PO intake.  Denies CP, SOB, cough, abdominal pain.    ROS neg except as per hpi    VITAL SIGNS:  T(F): 98.1 (12-25-17 @ 04:47)  HR: 73 (12-25-17 @ 04:47)  BP: 142/85 (12-25-17 @ 04:47)  RR: 18 (12-25-17 @ 04:47)  SpO2: 99% (12-25-17 @ 04:47)  Wt(kg): --    PHYSICAL EXAM:    Constitutional:  NAD, cachectic  Eyes:  EOMI, PERRL, sclera and conjunctiva clear  ENMT:  MMM, oropharynx WNL  Neck:  Supple, trachea midline, no JVD or LAD, no thyromegaly  Respiratory:  CTAB no wheezing rhonchi or rales  Cardiovascular:  S1S2 no m/r/g  Gastrointestinal:  NABS.  Soft, nontender, nondistended.  No guarding.  No organomegaly.  Extremities:  No edema.  No cyanosis.    Vascular:  peripheral pulses palpable b/l  Neurological:  Nonfocal.  Musculoskeletal:  No atrophy    MEDICATIONS  (STANDING):  acetaminophen  IVPB. 500 milliGRAM(s) IV Intermittent once  ALBUTerol    90 MICROgram(s) HFA Inhaler 1 Puff(s) Inhalation four times a day  atazanavir 300 milliGRAM(s) Oral daily  benzocaine 15 mG/menthol 3.6 mG Lozenge 1 Lozenge Oral every 4 hours  buDESOnide 160 MICROgram(s)/formoterol 4.5 MICROgram(s) Inhaler 2 Puff(s) Inhalation two times a day  dextrose 5% + sodium chloride 0.9%. 1000 milliLiter(s) (50 mL/Hr) IV Continuous <Continuous>  emtricitabine 200 mG/tenofovir alafenamide 25 mG (DESCOVY) Tablet 1 Tablet(s) Oral <User Schedule>  heparin  Injectable 5000 Unit(s) SubCutaneous every 12 hours  levETIRAcetam  IVPB 500 milliGRAM(s) IV Intermittent every 12 hours  melatonin 3 milliGRAM(s) Oral at bedtime  pantoprazole  Injectable 40 milliGRAM(s) IV Push daily  ritonavir  Solution 100 milliGRAM(s) Oral daily  simethicone drops 80 milliGRAM(s) Oral every 12 hours  sucralfate 1 Gram(s) Oral four times a day  trimethoprim  40 mG/sulfamethoxazole 200 mG Suspension 10 milliLiter(s) Oral daily  valACYclovir 1000 milliGRAM(s) Oral every 12 hours    MEDICATIONS  (PRN):  HYDROmorphone  Injectable 0.5 milliGRAM(s) IV Push every 10 minutes PRN Severe Pain (7 - 10)      Allergies    No Known Allergies    Intolerances        LABS:                        8.7    1.7   )-----------( 111      ( 24 Dec 2017 06:30 )             27.4     12-24    141  |  109<H>  |  16  ----------------------------<  131<H>  3.8   |  21<L>  |  1.11    Ca    7.9<L>      24 Dec 2017 06:30  Mg     2.0     12-24    TPro  5.7<L>  /  Alb  2.3<L>  /  TBili  0.6  /  DBili  x   /  AST  9<L>  /  ALT  7<L>  /  AlkPhos  63  12-24          RADIOLOGY & ADDITIONAL TESTS:

## 2017-12-25 NOTE — PROGRESS NOTE ADULT - PROBLEM SELECTOR PLAN 1
-In the setting of AIDS.  Presented with odynophagia, EGD showing erosive esophagitis c/w CMV vs HSV esophagitis.  C/w valtrex  -fluconazole discontinued.  -Encourage soft solids.  Patient tolerating liquids but still with significant pain.  c/w lozenges and tylenol.    -f/u CD4  -EGD also showing gastritis.  Added carafate.    -Follow up GI reccs regarding length of valtrex

## 2017-12-25 NOTE — PROGRESS NOTE ADULT - SUBJECTIVE AND OBJECTIVE BOX
Patient is a 57y old  Female who presents with a chief complaint of odynophagia (23 Dec 2017 19:42)      INTERVAL HPI/OVERNIGHT EVENTS:    Review of Systems: 12 point review of systems otherwise negative  ( - )fevers/chills  ( - ) dyspnea  ( - ) cough  ( - ) chest pain  ( - ) palpatations  ( - ) dizziness/lightheadedness  ( - ) nausea/vomiting  ( - ) abd pain  ( - ) diarrhea  ( - ) melena  ( - ) hematochezia  ( - ) dysuria  ( - ) hematuria  ( - ) leg swelling  ( -) calf tenderness  ( - ) motor weakness  ( - ) extremity numbness  ( - ) back pain  ( + ) tolerating POs  ( + ) BM    MEDICATIONS  (STANDING):  ALBUTerol    90 MICROgram(s) HFA Inhaler 1 Puff(s) Inhalation four times a day  atazanavir 300 milliGRAM(s) Oral daily  benzocaine 15 mG/menthol 3.6 mG Lozenge 1 Lozenge Oral every 4 hours  buDESOnide 160 MICROgram(s)/formoterol 4.5 MICROgram(s) Inhaler 2 Puff(s) Inhalation two times a day  dextrose 5% + sodium chloride 0.9%. 1000 milliLiter(s) (50 mL/Hr) IV Continuous <Continuous>  emtricitabine 200 mG/tenofovir alafenamide 25 mG (DESCOVY) Tablet 1 Tablet(s) Oral <User Schedule>  heparin  Injectable 5000 Unit(s) SubCutaneous every 12 hours  levETIRAcetam  IVPB 500 milliGRAM(s) IV Intermittent every 12 hours  melatonin 3 milliGRAM(s) Oral at bedtime  pantoprazole  Injectable 40 milliGRAM(s) IV Push two times a day  ritonavir  Solution 100 milliGRAM(s) Oral daily  simethicone drops 80 milliGRAM(s) Oral every 12 hours  sucralfate 1 Gram(s) Oral four times a day  trimethoprim  40 mG/sulfamethoxazole 200 mG Suspension 10 milliLiter(s) Oral daily  valACYclovir 1000 milliGRAM(s) Oral every 12 hours    MEDICATIONS  (PRN):  HYDROmorphone  Injectable 0.5 milliGRAM(s) IV Push every 10 minutes PRN Severe Pain (7 - 10)      Allergies    No Known Allergies    Intolerances          Vital Signs Last 24 Hrs  T(C): 36.9 (25 Dec 2017 08:57), Max: 36.9 (25 Dec 2017 08:57)  T(F): 98.5 (25 Dec 2017 08:57), Max: 98.5 (25 Dec 2017 08:57)  HR: 80 (25 Dec 2017 08:57) (72 - 80)  BP: 120/72 (25 Dec 2017 08:57) (120/72 - 142/85)  BP(mean): --  RR: 17 (25 Dec 2017 08:57) (17 - 18)  SpO2: 96% (25 Dec 2017 08:57) (96% - 99%)  CAPILLARY BLOOD GLUCOSE          12-24 @ 07:01  -  12-25 @ 07:00  --------------------------------------------------------  IN: 675 mL / OUT: 0 mL / NET: 675 mL        Physical Exam:    Daily     Daily   General:  Well appearing, NAD, not cachetic  HEENT:  Nonicteric, PERRLA  CV:  RRR, no murmur, no JVD  Lungs:  CTA B/L, no wheezes, rales, rhonchi  Abdomen:  Soft, non-tender, no distended, positive BS, no hepatosplenomegaly  Extremities:  2+ pulses, no c/c, no edema  Skin:  Warm and dry, no rashes  :  No monahan  Neuro:  AAOx3, non-focal, CN II-XII grossly intact  No Restraints    LABS:                        9.2    2.2   )-----------( 120      ( 25 Dec 2017 11:02 )             28.5     12-25    135  |  104  |  14  ----------------------------<  110<H>  3.6   |  23  |  1.00    Ca    8.0<L>      25 Dec 2017 11:01  Mg     1.8     12-25    TPro  5.7<L>  /  Alb  2.3<L>  /  TBili  0.6  /  DBili  x   /  AST  9<L>  /  ALT  7<L>  /  AlkPhos  63  12-24            RADIOLOGY & ADDITIONAL TESTS:    ---------------------------------------------------------------------------  I personally reviewed: [  ]EKG   [  ]CXR    [  ] CT    [  ]Other  ---------------------------------------------------------------------------  PLEASE CHECK WHEN PRESENT:     [  ]Heart Failure     [  ] Acute     [  ] Acute on Chronic     [  ] Chronic  -------------------------------------------------------------------     [  ]Diastolic [HFpEF]     [  ]Systolic [HFrEF]     [  ]Combined [HFpEF & HFrEF]     [  ]Other:  -------------------------------------------------------------------  [  ]SEVERINO     [  ]ATN     [  ]Reneal Medullary Necrosis     [  ]Renal Cortical Necrosis     [  ]Other Pathological Lesions:    [  ]CKD 1  [  ]CKD 2  [  ]CKD 3  [  ]CKD 4  [  ]CKD 5  [  ]Other  -------------------------------------------------------------------  [  ]Other/Unspecified:    --------------------------------------------------------------------    Abdominal Nutritional Status  [  ]Malnutrition: See Nutrition Note  [ x ]Cachexia  [  ]Other:   [  ]Supplement Ordered:  [  ]Morbid Obesity (BMI >=40]

## 2017-12-25 NOTE — PROGRESS NOTE ADULT - PROBLEM SELECTOR PLAN 2
h/o OIs; h/o rx non-compliance; HIV team consulted,   HIV RNA VL - 92,712  - CD4 count pending  c/w HAART thx  Bactrim for PCP ppx   if CD4 count less than 50 add azithro  -C/w HAART. Descovy, norvir, reyatax

## 2017-12-25 NOTE — PROGRESS NOTE ADULT - PROBLEM SELECTOR PLAN 2
As per outpatient records, the patient's BMI was 14.08 11/29 and ranged from 16 to 17 in the past few years. She has been prescribed ensure and megestrol, suggesting that this is not a new development. Her recent weight loss may be related to decreased PO intake from odynophagia. Also concern for malignancy  -Nutrition following  -added ensure and prostat

## 2017-12-26 VITALS
OXYGEN SATURATION: 95 % | DIASTOLIC BLOOD PRESSURE: 84 MMHG | SYSTOLIC BLOOD PRESSURE: 148 MMHG | RESPIRATION RATE: 18 BRPM | HEART RATE: 73 BPM | TEMPERATURE: 98 F

## 2017-12-26 DIAGNOSIS — R64 CACHEXIA: ICD-10-CM

## 2017-12-26 DIAGNOSIS — E43 UNSPECIFIED SEVERE PROTEIN-CALORIE MALNUTRITION: ICD-10-CM

## 2017-12-26 LAB
4/8 RATIO: 0.06 RATIO — LOW (ref 0.9–3.6)
ABS CD8: 581 /UL — SIGNIFICANT CHANGE UP (ref 136–757)
ANION GAP SERPL CALC-SCNC: 9 MMOL/L — SIGNIFICANT CHANGE UP (ref 5–17)
BUN SERPL-MCNC: 21 MG/DL — SIGNIFICANT CHANGE UP (ref 7–23)
CALCIUM SERPL-MCNC: 7.8 MG/DL — LOW (ref 8.4–10.5)
CD3 BLASTS SPEC-ACNC: 617 /UL — LOW (ref 799–2171)
CD3 BLASTS SPEC-ACNC: 91 % — HIGH (ref 59–85)
CD4 %: 6 % — LOW (ref 36–65)
CD8 %: 85 % — HIGH (ref 11–36)
CHLORIDE SERPL-SCNC: 107 MMOL/L — SIGNIFICANT CHANGE UP (ref 96–108)
CO2 SERPL-SCNC: 23 MMOL/L — SIGNIFICANT CHANGE UP (ref 22–31)
CREAT SERPL-MCNC: 1.22 MG/DL — SIGNIFICANT CHANGE UP (ref 0.5–1.3)
GLUCOSE SERPL-MCNC: 120 MG/DL — HIGH (ref 70–99)
HCT VFR BLD CALC: 29.1 % — LOW (ref 34.5–45)
HGB BLD-MCNC: 9.4 G/DL — LOW (ref 11.5–15.5)
MAGNESIUM SERPL-MCNC: 1.8 MG/DL — SIGNIFICANT CHANGE UP (ref 1.6–2.6)
MCHC RBC-ENTMCNC: 26.3 PG — LOW (ref 27–34)
MCHC RBC-ENTMCNC: 32.3 G/DL — SIGNIFICANT CHANGE UP (ref 32–36)
MCV RBC AUTO: 81.5 FL — SIGNIFICANT CHANGE UP (ref 80–100)
PLATELET # BLD AUTO: 112 K/UL — LOW (ref 150–400)
POTASSIUM SERPL-MCNC: 4.2 MMOL/L — SIGNIFICANT CHANGE UP (ref 3.5–5.3)
POTASSIUM SERPL-SCNC: 4.2 MMOL/L — SIGNIFICANT CHANGE UP (ref 3.5–5.3)
RBC # BLD: 3.57 M/UL — LOW (ref 3.8–5.2)
RBC # FLD: 14.5 % — SIGNIFICANT CHANGE UP (ref 10.3–16.9)
SODIUM SERPL-SCNC: 139 MMOL/L — SIGNIFICANT CHANGE UP (ref 135–145)
T-CELL CD4 SUBSET PNL BLD: 37 /UL — LOW (ref 489–1457)
WBC # BLD: 2.1 K/UL — LOW (ref 3.8–10.5)
WBC # FLD AUTO: 2.1 K/UL — LOW (ref 3.8–10.5)

## 2017-12-26 PROCEDURE — 84300 ASSAY OF URINE SODIUM: CPT

## 2017-12-26 PROCEDURE — 85730 THROMBOPLASTIN TIME PARTIAL: CPT

## 2017-12-26 PROCEDURE — 85025 COMPLETE CBC W/AUTO DIFF WBC: CPT

## 2017-12-26 PROCEDURE — 86901 BLOOD TYPING SEROLOGIC RH(D): CPT

## 2017-12-26 PROCEDURE — 80307 DRUG TEST PRSMV CHEM ANLYZR: CPT

## 2017-12-26 PROCEDURE — 83550 IRON BINDING TEST: CPT

## 2017-12-26 PROCEDURE — 82607 VITAMIN B-12: CPT

## 2017-12-26 PROCEDURE — 82550 ASSAY OF CK (CPK): CPT

## 2017-12-26 PROCEDURE — 82570 ASSAY OF URINE CREATININE: CPT

## 2017-12-26 PROCEDURE — 84100 ASSAY OF PHOSPHORUS: CPT

## 2017-12-26 PROCEDURE — 87536 HIV-1 QUANT&REVRSE TRNSCRPJ: CPT

## 2017-12-26 PROCEDURE — 85610 PROTHROMBIN TIME: CPT

## 2017-12-26 PROCEDURE — 86695 HERPES SIMPLEX TYPE 1 TEST: CPT

## 2017-12-26 PROCEDURE — 36415 COLL VENOUS BLD VENIPUNCTURE: CPT

## 2017-12-26 PROCEDURE — 85027 COMPLETE CBC AUTOMATED: CPT

## 2017-12-26 PROCEDURE — 88341 IMHCHEM/IMCYTCHM EA ADD ANTB: CPT

## 2017-12-26 PROCEDURE — 86359 T CELLS TOTAL COUNT: CPT

## 2017-12-26 PROCEDURE — 83735 ASSAY OF MAGNESIUM: CPT

## 2017-12-26 PROCEDURE — 84484 ASSAY OF TROPONIN QUANT: CPT

## 2017-12-26 PROCEDURE — 84466 ASSAY OF TRANSFERRIN: CPT

## 2017-12-26 PROCEDURE — 94640 AIRWAY INHALATION TREATMENT: CPT

## 2017-12-26 PROCEDURE — 84443 ASSAY THYROID STIM HORMONE: CPT

## 2017-12-26 PROCEDURE — 99239 HOSP IP/OBS DSCHRG MGMT >30: CPT

## 2017-12-26 PROCEDURE — 87040 BLOOD CULTURE FOR BACTERIA: CPT

## 2017-12-26 PROCEDURE — 86900 BLOOD TYPING SEROLOGIC ABO: CPT

## 2017-12-26 PROCEDURE — 86696 HERPES SIMPLEX TYPE 2 TEST: CPT

## 2017-12-26 PROCEDURE — 86850 RBC ANTIBODY SCREEN: CPT

## 2017-12-26 PROCEDURE — 71045 X-RAY EXAM CHEST 1 VIEW: CPT

## 2017-12-26 PROCEDURE — 82728 ASSAY OF FERRITIN: CPT

## 2017-12-26 PROCEDURE — 96374 THER/PROPH/DIAG INJ IV PUSH: CPT

## 2017-12-26 PROCEDURE — 93005 ELECTROCARDIOGRAM TRACING: CPT

## 2017-12-26 PROCEDURE — 80048 BASIC METABOLIC PNL TOTAL CA: CPT

## 2017-12-26 PROCEDURE — 82746 ASSAY OF FOLIC ACID SERUM: CPT

## 2017-12-26 PROCEDURE — 76700 US EXAM ABDOM COMPLETE: CPT

## 2017-12-26 PROCEDURE — 82553 CREATINE MB FRACTION: CPT

## 2017-12-26 PROCEDURE — 99285 EMERGENCY DEPT VISIT HI MDM: CPT | Mod: 25

## 2017-12-26 PROCEDURE — 83880 ASSAY OF NATRIURETIC PEPTIDE: CPT

## 2017-12-26 PROCEDURE — 80053 COMPREHEN METABOLIC PANEL: CPT

## 2017-12-26 PROCEDURE — 88305 TISSUE EXAM BY PATHOLOGIST: CPT

## 2017-12-26 RX ORDER — ONDANSETRON 8 MG/1
4 TABLET, FILM COATED ORAL ONCE
Qty: 0 | Refills: 0 | Status: COMPLETED | OUTPATIENT
Start: 2017-12-26 | End: 2017-12-26

## 2017-12-26 RX ORDER — AZITHROMYCIN 500 MG/1
1200 TABLET, FILM COATED ORAL ONCE
Qty: 0 | Refills: 0 | Status: COMPLETED | OUTPATIENT
Start: 2017-12-26 | End: 2017-12-26

## 2017-12-26 RX ORDER — AZITHROMYCIN 500 MG/1
1200 TABLET, FILM COATED ORAL
Qty: 0 | Refills: 0 | Status: DISCONTINUED | OUTPATIENT
Start: 2017-12-26 | End: 2017-12-26

## 2017-12-26 RX ORDER — VALACYCLOVIR 500 MG/1
1 TABLET, FILM COATED ORAL
Qty: 60 | Refills: 0 | OUTPATIENT
Start: 2017-12-26 | End: 2018-01-24

## 2017-12-26 RX ORDER — AZITHROMYCIN 500 MG/1
1200 TABLET, FILM COATED ORAL
Qty: 0 | Refills: 0 | Status: CANCELLED | OUTPATIENT
Start: 2018-01-02 | End: 2017-12-26

## 2017-12-26 RX ORDER — OMEPRAZOLE 10 MG/1
1 CAPSULE, DELAYED RELEASE ORAL
Qty: 30 | Refills: 0 | OUTPATIENT
Start: 2017-12-26 | End: 2018-01-24

## 2017-12-26 RX ORDER — ATAZANAVIR 200 MG/1
1 CAPSULE ORAL
Qty: 30 | Refills: 0 | OUTPATIENT
Start: 2017-12-26 | End: 2018-01-24

## 2017-12-26 RX ORDER — ACETAMINOPHEN 500 MG
500 TABLET ORAL ONCE
Qty: 0 | Refills: 0 | Status: COMPLETED | OUTPATIENT
Start: 2017-12-26 | End: 2017-12-26

## 2017-12-26 RX ORDER — EMTRICITABINE AND TENOFOVIR DISOPROXIL FUMARATE 200; 300 MG/1; MG/1
1 TABLET, FILM COATED ORAL
Qty: 0 | Refills: 0 | COMMUNITY

## 2017-12-26 RX ORDER — MAGNESIUM SULFATE 500 MG/ML
1 VIAL (ML) INJECTION ONCE
Qty: 0 | Refills: 0 | Status: COMPLETED | OUTPATIENT
Start: 2017-12-26 | End: 2017-12-26

## 2017-12-26 RX ORDER — ATAZANAVIR 200 MG/1
1 CAPSULE ORAL
Qty: 0 | Refills: 0 | COMMUNITY

## 2017-12-26 RX ORDER — PANTOPRAZOLE SODIUM 20 MG/1
40 TABLET, DELAYED RELEASE ORAL
Qty: 0 | Refills: 0 | Status: DISCONTINUED | OUTPATIENT
Start: 2017-12-27 | End: 2017-12-26

## 2017-12-26 RX ORDER — LEVETIRACETAM 250 MG/1
500 TABLET, FILM COATED ORAL
Qty: 0 | Refills: 0 | Status: DISCONTINUED | OUTPATIENT
Start: 2017-12-26 | End: 2017-12-26

## 2017-12-26 RX ORDER — FAMOTIDINE 10 MG/ML
1 INJECTION INTRAVENOUS
Qty: 0 | Refills: 0 | COMMUNITY

## 2017-12-26 RX ORDER — PANTOPRAZOLE SODIUM 20 MG/1
40 TABLET, DELAYED RELEASE ORAL
Qty: 0 | Refills: 0 | Status: DISCONTINUED | OUTPATIENT
Start: 2017-12-26 | End: 2017-12-26

## 2017-12-26 RX ORDER — EMTRICITABINE AND TENOFOVIR DISOPROXIL FUMARATE 200; 300 MG/1; MG/1
1 TABLET, FILM COATED ORAL
Qty: 15 | Refills: 0 | OUTPATIENT
Start: 2017-12-26 | End: 2018-01-24

## 2017-12-26 RX ORDER — RITONAVIR 100 MG/1
1 TABLET, FILM COATED ORAL
Qty: 30 | Refills: 0 | OUTPATIENT
Start: 2017-12-26 | End: 2018-01-24

## 2017-12-26 RX ORDER — RITONAVIR 100 MG/1
1 TABLET, FILM COATED ORAL
Qty: 0 | Refills: 0 | COMMUNITY

## 2017-12-26 RX ADMIN — Medication 10 MILLILITER(S): at 10:10

## 2017-12-26 RX ADMIN — ALBUTEROL 1 PUFF(S): 90 AEROSOL, METERED ORAL at 06:47

## 2017-12-26 RX ADMIN — ATAZANAVIR 300 MILLIGRAM(S): 200 CAPSULE ORAL at 10:11

## 2017-12-26 RX ADMIN — EMTRICITABINE AND TENOFOVIR DISOPROXIL FUMARATE 1 TABLET(S): 200; 300 TABLET, FILM COATED ORAL at 09:13

## 2017-12-26 RX ADMIN — Medication 500 MILLIGRAM(S): at 09:15

## 2017-12-26 RX ADMIN — ALBUTEROL 1 PUFF(S): 90 AEROSOL, METERED ORAL at 00:40

## 2017-12-26 RX ADMIN — BUDESONIDE AND FORMOTEROL FUMARATE DIHYDRATE 2 PUFF(S): 160; 4.5 AEROSOL RESPIRATORY (INHALATION) at 09:12

## 2017-12-26 RX ADMIN — AZITHROMYCIN 1200 MILLIGRAM(S): 500 TABLET, FILM COATED ORAL at 07:44

## 2017-12-26 RX ADMIN — VALACYCLOVIR 1000 MILLIGRAM(S): 500 TABLET, FILM COATED ORAL at 06:46

## 2017-12-26 RX ADMIN — RITONAVIR 100 MILLIGRAM(S): 100 TABLET, FILM COATED ORAL at 10:10

## 2017-12-26 RX ADMIN — Medication 200 MILLIGRAM(S): at 09:02

## 2017-12-26 RX ADMIN — Medication 100 GRAM(S): at 07:44

## 2017-12-26 RX ADMIN — Medication 1 GRAM(S): at 06:46

## 2017-12-26 RX ADMIN — PANTOPRAZOLE SODIUM 40 MILLIGRAM(S): 20 TABLET, DELAYED RELEASE ORAL at 06:47

## 2017-12-26 RX ADMIN — BENZOCAINE AND MENTHOL 1 LOZENGE: 5; 1 LIQUID ORAL at 06:47

## 2017-12-26 RX ADMIN — HEPARIN SODIUM 5000 UNIT(S): 5000 INJECTION INTRAVENOUS; SUBCUTANEOUS at 06:47

## 2017-12-26 RX ADMIN — ONDANSETRON 4 MILLIGRAM(S): 8 TABLET, FILM COATED ORAL at 10:09

## 2017-12-26 RX ADMIN — LEVETIRACETAM 420 MILLIGRAM(S): 250 TABLET, FILM COATED ORAL at 06:46

## 2017-12-26 RX ADMIN — Medication 1 GRAM(S): at 00:40

## 2017-12-26 RX ADMIN — SIMETHICONE 80 MILLIGRAM(S): 80 TABLET, CHEWABLE ORAL at 06:46

## 2017-12-26 RX ADMIN — ALBUTEROL 1 PUFF(S): 90 AEROSOL, METERED ORAL at 09:12

## 2017-12-26 NOTE — PROGRESS NOTE ADULT - PROVIDER SPECIALTY LIST ADULT
Hospitalist
Internal Medicine
Gastroenterology

## 2017-12-26 NOTE — PROGRESS NOTE ADULT - PROBLEM SELECTOR PLAN 3
d/t poor PO intake, d/t odynophagia and dysphagia; BUN decreased w/ IVF and increased P.O. intake; monitor BMP

## 2017-12-26 NOTE — PROGRESS NOTE ADULT - ASSESSMENT
57yr old F with a PMHx of HCV (unsure if Rx), GERD, HIV/AIDs (CD4: 80, VL: 74,424 - 07/2017, on HAART with questionable compliance), Asthma, IVDA (used heroin yesterday), seizure disorder (last seizure last month) who presents for evaluation of difficulty swallowing.    # Dysphagia/Odynophagia -   - sp EGD 12/22/17 - with severe esophagitis and shallow erosions in mid-distal esophagus sp biopsy  - follow up pathology  - continue PPI BID  - start Carafate solution 4x/day  - start valacyclovir 1g BID  - can advance diet as tolerated      Discussed with attending Dr Dobson  GI will sign off for now, please reconsult as needed
56 y/o F w/
57 year old female with a PMHx of HIV (on HAART but questionable compliance, 7/2017 CD4: 80, VL: 74,424), asthma, IVDA (used heroin yesterday), HepC, GERD and a seizure disorder (last seizure last month) admitted with likely viral esophagitis based on EGD.
57 year old female with a PMHx of HIV (on HAART but questionable compliance, 7/2017 CD4: 80, VL: 74,424), asthma, IVDA (used heroin yesterday), HepC, GERD and a seizure disorder (last seizure last month) admitted with likely viral esophagitis based on EGD.
58 y/o F w/
Patient is a 57 year old female with a PMHx of HIV (on HAART but questionable compliance, 7/2017 CD4: 80, VL: 74,424), asthma, IVDA (used heroin yesterday), HepC, GERD and a seizure disorder (last seizure last month) admitted with likely viral esophagitis based on EGD.

## 2017-12-26 NOTE — PROGRESS NOTE ADULT - SUBJECTIVE AND OBJECTIVE BOX
INTERVAL HPI/OVERNIGHT EVENTS:    VITAL SIGNS:  T(F): 98.3 (12-26-17 @ 06:23)  HR: 78 (12-26-17 @ 06:23)  BP: 127/73 (12-26-17 @ 06:23)  RR: 17 (12-26-17 @ 06:23)  SpO2: 96% (12-26-17 @ 06:23)  Wt(kg): --    PHYSICAL EXAM:          MEDICATIONS  (STANDING):  ALBUTerol    90 MICROgram(s) HFA Inhaler 1 Puff(s) Inhalation four times a day  atazanavir 300 milliGRAM(s) Oral daily  azithromycin   Tablet 1200 milliGRAM(s) Oral once  benzocaine 15 mG/menthol 3.6 mG Lozenge 1 Lozenge Oral every 4 hours  buDESOnide 160 MICROgram(s)/formoterol 4.5 MICROgram(s) Inhaler 2 Puff(s) Inhalation two times a day  dextrose 5% + sodium chloride 0.9%. 1000 milliLiter(s) (50 mL/Hr) IV Continuous <Continuous>  emtricitabine 200 mG/tenofovir alafenamide 25 mG (DESCOVY) Tablet 1 Tablet(s) Oral <User Schedule>  heparin  Injectable 5000 Unit(s) SubCutaneous every 12 hours  levETIRAcetam  IVPB 500 milliGRAM(s) IV Intermittent every 12 hours  melatonin 3 milliGRAM(s) Oral at bedtime  pantoprazole  Injectable 40 milliGRAM(s) IV Push two times a day  ritonavir  Solution 100 milliGRAM(s) Oral daily  simethicone drops 80 milliGRAM(s) Oral every 12 hours  sucralfate 1 Gram(s) Oral four times a day  trimethoprim  40 mG/sulfamethoxazole 200 mG Suspension 10 milliLiter(s) Oral daily  valACYclovir 1000 milliGRAM(s) Oral every 12 hours    MEDICATIONS  (PRN):  HYDROmorphone  Injectable 0.5 milliGRAM(s) IV Push every 10 minutes PRN Severe Pain (7 - 10)      Allergies    No Known Allergies    Intolerances        LABS:                        9.4    2.1   )-----------( 112      ( 26 Dec 2017 05:44 )             29.1     12-25    135  |  104  |  14  ----------------------------<  110<H>  3.6   |  23  |  1.00    Ca    8.0<L>      25 Dec 2017 11:01  Mg     1.8     12-25      PT/INR - ( 25 Dec 2017 18:19 )   PT: 10.5 sec;   INR: 0.95          PTT - ( 25 Dec 2017 18:19 )  PTT:26.9 sec      RADIOLOGY & ADDITIONAL TESTS: INTERVAL HPI/OVERNIGHT EVENTS:  No acute overnight events.      Seen and examined at bedside.  Reports that pain with swallowing is the same as before, but that she is making herself eat more solid foods.  Denies F/C, abd pain, N/V/D/C, dysuria, CP, SOB, cough.    ROS neg except as per hpi    VITAL SIGNS:  T(F): 98.3 (12-26-17 @ 06:23)  HR: 78 (12-26-17 @ 06:23)  BP: 127/73 (12-26-17 @ 06:23)  RR: 17 (12-26-17 @ 06:23)  SpO2: 96% (12-26-17 @ 06:23)  Wt(kg): --    PHYSICAL EXAM:    Constitutional:  NAD, cachectic  Eyes:  EOMI, PERRL, sclera and conjunctiva clear  ENMT:  MMM, oropharynx WNL  Neck:  Supple, trachea midline, no JVD or LAD, no thyromegaly  Respiratory:  CTAB no wheezing rhonchi or rales  Cardiovascular:  S1S2 no m/r/g  Gastrointestinal:  NABS.  Soft, nontender, nondistended.  No guarding.  No organomegaly.  Extremities:  No edema.  No cyanosis.    Vascular:  peripheral pulses palpable b/l  Neurological:  Nonfocal.  Musculoskeletal:  No atrophy      MEDICATIONS  (STANDING):  ALBUTerol    90 MICROgram(s) HFA Inhaler 1 Puff(s) Inhalation four times a day  atazanavir 300 milliGRAM(s) Oral daily  azithromycin   Tablet 1200 milliGRAM(s) Oral once  benzocaine 15 mG/menthol 3.6 mG Lozenge 1 Lozenge Oral every 4 hours  buDESOnide 160 MICROgram(s)/formoterol 4.5 MICROgram(s) Inhaler 2 Puff(s) Inhalation two times a day  dextrose 5% + sodium chloride 0.9%. 1000 milliLiter(s) (50 mL/Hr) IV Continuous <Continuous>  emtricitabine 200 mG/tenofovir alafenamide 25 mG (DESCOVY) Tablet 1 Tablet(s) Oral <User Schedule>  heparin  Injectable 5000 Unit(s) SubCutaneous every 12 hours  levETIRAcetam  IVPB 500 milliGRAM(s) IV Intermittent every 12 hours  melatonin 3 milliGRAM(s) Oral at bedtime  pantoprazole  Injectable 40 milliGRAM(s) IV Push two times a day  ritonavir  Solution 100 milliGRAM(s) Oral daily  simethicone drops 80 milliGRAM(s) Oral every 12 hours  sucralfate 1 Gram(s) Oral four times a day  trimethoprim  40 mG/sulfamethoxazole 200 mG Suspension 10 milliLiter(s) Oral daily  valACYclovir 1000 milliGRAM(s) Oral every 12 hours    MEDICATIONS  (PRN):  HYDROmorphone  Injectable 0.5 milliGRAM(s) IV Push every 10 minutes PRN Severe Pain (7 - 10)      Allergies    No Known Allergies    Intolerances        LABS:                        9.4    2.1   )-----------( 112      ( 26 Dec 2017 05:44 )             29.1     12-25    135  |  104  |  14  ----------------------------<  110<H>  3.6   |  23  |  1.00    Ca    8.0<L>      25 Dec 2017 11:01  Mg     1.8     12-25      PT/INR - ( 25 Dec 2017 18:19 )   PT: 10.5 sec;   INR: 0.95          PTT - ( 25 Dec 2017 18:19 )  PTT:26.9 sec      RADIOLOGY & ADDITIONAL TESTS: reviewed

## 2017-12-26 NOTE — PROGRESS NOTE ADULT - SUBJECTIVE AND OBJECTIVE BOX
Patient is a 57y old  Female who presents with a chief complaint of odynophagia (23 Dec 2017 19:42)      INTERVAL HPI/OVERNIGHT EVENTS:    Pt. seen and examined at 11:30AM  Pt. tolerating P.O. diet w/ less odynophagia  No F/C, cough, SOB    Review of Systems: 12 point review of systems otherwise negative    MEDICATIONS  (STANDING):  ALBUTerol    90 MICROgram(s) HFA Inhaler 1 Puff(s) Inhalation four times a day  atazanavir 300 milliGRAM(s) Oral daily  benzocaine 15 mG/menthol 3.6 mG Lozenge 1 Lozenge Oral every 4 hours  buDESOnide 160 MICROgram(s)/formoterol 4.5 MICROgram(s) Inhaler 2 Puff(s) Inhalation two times a day  emtricitabine 200 mG/tenofovir alafenamide 25 mG (DESCOVY) Tablet 1 Tablet(s) Oral <User Schedule>  heparin  Injectable 5000 Unit(s) SubCutaneous every 12 hours  levETIRAcetam 500 milliGRAM(s) Oral two times a day  melatonin 3 milliGRAM(s) Oral at bedtime  ritonavir  Solution 100 milliGRAM(s) Oral daily  simethicone drops 80 milliGRAM(s) Oral every 12 hours  sucralfate 1 Gram(s) Oral four times a day  trimethoprim  40 mG/sulfamethoxazole 200 mG Suspension 10 milliLiter(s) Oral daily  valACYclovir 1000 milliGRAM(s) Oral every 12 hours    MEDICATIONS  (PRN):      Allergies    No Known Allergies    Intolerances          Vital Signs Last 24 Hrs  T(C): 36.7 (26 Dec 2017 09:35), Max: 36.9 (25 Dec 2017 17:00)  T(F): 98.1 (26 Dec 2017 09:35), Max: 98.4 (25 Dec 2017 17:00)  HR: 73 (26 Dec 2017 09:35) (73 - 78)  BP: 148/84 (26 Dec 2017 09:35) (110/70 - 148/84)  BP(mean): --  RR: 18 (26 Dec 2017 09:35) (16 - 18)  SpO2: 95% (26 Dec 2017 09:35) (95% - 96%)  CAPILLARY BLOOD GLUCOSE          12-25 @ 07:01  -  12-26 @ 07:00  --------------------------------------------------------  IN: 700 mL / OUT: 0 mL / NET: 700 mL        Physical Exam:  (at 11:30AM)  Daily     Daily   General:  chronically-ill but comfortable-appearing  HEENT:  MMM, no thrush  Skin:  WWP  Neuro:  AAOx3    LABS:                        9.4    2.1   )-----------( 112      ( 26 Dec 2017 05:44 )             29.1     12-26    139  |  107  |  21  ----------------------------<  120<H>  4.2   |  23  |  1.22    Ca    7.8<L>      26 Dec 2017 05:44  Mg     1.8     12-26      PT/INR - ( 25 Dec 2017 18:19 )   PT: 10.5 sec;   INR: 0.95          PTT - ( 25 Dec 2017 18:19 )  PTT:26.9 sec        RADIOLOGY & ADDITIONAL TESTS:    ---------------------------------------------------------------------------  I personally reviewed: [  ]EKG   [  ]CXR    [  ] CT    [  ]Other  ---------------------------------------------------------------------------  PLEASE CHECK WHEN PRESENT:     [  ]Heart Failure     [  ] Acute     [  ] Acute on Chronic     [  ] Chronic  -------------------------------------------------------------------     [  ]Diastolic [HFpEF]     [  ]Systolic [HFrEF]     [  ]Combined [HFpEF & HFrEF]     [  ]Other:  -------------------------------------------------------------------  [  ]SEVERINO     [  ]ATN     [  ]Reneal Medullary Necrosis     [  ]Renal Cortical Necrosis     [  ]Other Pathological Lesions:    [  ]CKD 1  [  ]CKD 2  [  ]CKD 3  [  ]CKD 4  [  ]CKD 5  [  ]Other  -------------------------------------------------------------------  [  ]Other/Unspecified:    --------------------------------------------------------------------    Abdominal Nutritional Status  [  ]Malnutrition: See Nutrition Note  [  ]Cachexia  [  ]Other:   [  ]Supplement Ordered:  [  ]Morbid Obesity (BMI >=40]

## 2017-12-26 NOTE — PROGRESS NOTE ADULT - PROBLEM SELECTOR PLAN 2
-CD4 37.  VL >90k.  -Continue with bactrim and azithro  -follow up with Dr. Cornelius outpatient  -c/w HAART -CD4 37.  VL >90k.  -Continue with bactrim daily and azithro weekly  -follow up with Dr. Cornelius outpatient  -c/w HAART

## 2017-12-26 NOTE — PROGRESS NOTE ADULT - PROBLEM SELECTOR PLAN 1
as seen on EGD, likely d/t CMV or HSV; appreciate GI recs; cont. soft diet as tolerated, Valtrex, Carafate, f/u biopsies

## 2017-12-26 NOTE — PROGRESS NOTE ADULT - PROBLEM SELECTOR PLAN 1
-In the setting of AIDS.  Presented with odynophagia, EGD showing erosive esophagitis c/w CMV vs HSV esophagitis.  C/w valtrex  -fluconazole discontinued.  -Encourage soft solids.  Patient tolerating liquids but still with significant pain.  c/w lozenges and IV tylenol.    -CD<50.  Gave azithro 1200 mg today  -EGD also showing gastritis.  Added carafate.    -Follow up GI reccs regarding length of valtrex

## 2017-12-26 NOTE — PROGRESS NOTE ADULT - PROBLEM SELECTOR PLAN 7
Likely from HIV.  -Hgb stable 8-9.  Iron studies inconsistent with iron deficiency.  No evidence of GIB based on EGD, but will follow up FOBT.  Rectal exam unremarkable.  -Plt 102k.  Trend.    -Maintain active T&S Likely from HIV.  -Hgb stable at ~9.  Iron studies inconsistent with iron deficiency.  No evidence of GIB based on EGD, but will follow up FOBT.  Rectal exam unremarkable.  -Plt stable, 112k.  Trend.    -Maintain active T&S

## 2017-12-26 NOTE — PROGRESS NOTE ADULT - PROBLEM SELECTOR PLAN 4
Tested positive in the past  -Ordered for viral load in AM  -LFTs unremarkable Tested positive in the past  -VL >500k.  Abd U/S showed hepatosplenomegaly, trace hepatic ascites, and heterogeneous liver parenchyma.   -LFTs stable, unremarkable  -follow up with Dr. Cornelius outpatient

## 2017-12-27 LAB
CULTURE RESULTS: SIGNIFICANT CHANGE UP
CULTURE RESULTS: SIGNIFICANT CHANGE UP
HSV1 IGG SER-ACNC: 26.6 INDEX — HIGH
HSV1 IGG SERPL QL IA: POSITIVE
HSV2 IGG FLD-ACNC: 0.09 INDEX — SIGNIFICANT CHANGE UP
HSV2 IGG SERPL QL IA: NEGATIVE — SIGNIFICANT CHANGE UP
SPECIMEN SOURCE: SIGNIFICANT CHANGE UP
SPECIMEN SOURCE: SIGNIFICANT CHANGE UP

## 2017-12-28 ENCOUNTER — APPOINTMENT (OUTPATIENT)
Dept: INFECTIOUS DISEASE | Facility: CLINIC | Age: 57
End: 2017-12-28

## 2017-12-28 DIAGNOSIS — K20.9 ESOPHAGITIS, UNSPECIFIED: ICD-10-CM

## 2017-12-28 DIAGNOSIS — J44.9 CHRONIC OBSTRUCTIVE PULMONARY DISEASE, UNSPECIFIED: ICD-10-CM

## 2017-12-28 DIAGNOSIS — G40.909 EPILEPSY, UNSPECIFIED, NOT INTRACTABLE, WITHOUT STATUS EPILEPTICUS: ICD-10-CM

## 2017-12-28 DIAGNOSIS — R63.4 ABNORMAL WEIGHT LOSS: ICD-10-CM

## 2017-12-28 DIAGNOSIS — M51.16 INTERVERTEBRAL DISC DISORDERS WITH RADICULOPATHY, LUMBAR REGION: ICD-10-CM

## 2017-12-28 DIAGNOSIS — B18.2 CHRONIC VIRAL HEPATITIS C: ICD-10-CM

## 2017-12-28 DIAGNOSIS — K21.9 GASTRO-ESOPHAGEAL REFLUX DISEASE WITHOUT ESOPHAGITIS: ICD-10-CM

## 2017-12-28 DIAGNOSIS — K29.70 GASTRITIS, UNSPECIFIED, WITHOUT BLEEDING: ICD-10-CM

## 2017-12-28 DIAGNOSIS — R13.10 DYSPHAGIA, UNSPECIFIED: ICD-10-CM

## 2017-12-28 DIAGNOSIS — Z87.891 PERSONAL HISTORY OF NICOTINE DEPENDENCE: ICD-10-CM

## 2017-12-28 DIAGNOSIS — B20 HUMAN IMMUNODEFICIENCY VIRUS [HIV] DISEASE: ICD-10-CM

## 2017-12-28 LAB
CMV DNA CSF QL NAA+PROBE: SIGNIFICANT CHANGE UP
CMV DNA SPEC NAA+PROBE-LOG#: (no result) LOGIU/ML
SURGICAL PATHOLOGY STUDY: SIGNIFICANT CHANGE UP

## 2018-01-02 RX ORDER — AZITHROMYCIN 500 MG/1
2 TABLET, FILM COATED ORAL
Qty: 9 | Refills: 0 | OUTPATIENT
Start: 2018-01-02 | End: 2018-01-31

## 2018-01-10 ENCOUNTER — APPOINTMENT (OUTPATIENT)
Dept: INFECTIOUS DISEASE | Facility: CLINIC | Age: 58
End: 2018-01-10

## 2018-02-16 ENCOUNTER — APPOINTMENT (OUTPATIENT)
Dept: INFECTIOUS DISEASE | Facility: CLINIC | Age: 58
End: 2018-02-16

## 2018-02-22 ENCOUNTER — APPOINTMENT (OUTPATIENT)
Dept: INFECTIOUS DISEASE | Facility: CLINIC | Age: 58
End: 2018-02-22
Payer: MEDICARE

## 2018-02-22 ENCOUNTER — OUTPATIENT (OUTPATIENT)
Dept: OUTPATIENT SERVICES | Facility: HOSPITAL | Age: 58
LOS: 1 days | End: 2018-02-22

## 2018-02-22 VITALS
HEIGHT: 64 IN | OXYGEN SATURATION: 95 % | SYSTOLIC BLOOD PRESSURE: 117 MMHG | WEIGHT: 90 LBS | TEMPERATURE: 98 F | HEART RATE: 76 BPM | BODY MASS INDEX: 15.36 KG/M2 | DIASTOLIC BLOOD PRESSURE: 78 MMHG

## 2018-02-22 DIAGNOSIS — B00.9 HERPESVIRAL INFECTION, UNSPECIFIED: ICD-10-CM

## 2018-02-22 DIAGNOSIS — B49 UNSPECIFIED MYCOSIS: ICD-10-CM

## 2018-02-22 LAB
ALBUMIN SERPL ELPH-MCNC: 3.8 G/DL — SIGNIFICANT CHANGE UP (ref 3.3–5)
ALP SERPL-CCNC: 86 U/L — SIGNIFICANT CHANGE UP (ref 40–120)
ALT FLD-CCNC: 13 U/L — SIGNIFICANT CHANGE UP (ref 10–45)
ANION GAP SERPL CALC-SCNC: 12 MMOL/L — SIGNIFICANT CHANGE UP (ref 5–17)
AST SERPL-CCNC: 16 U/L — SIGNIFICANT CHANGE UP (ref 10–40)
BASOPHILS NFR BLD AUTO: 0.3 % — SIGNIFICANT CHANGE UP (ref 0–2)
BILIRUB SERPL-MCNC: 0.2 MG/DL — SIGNIFICANT CHANGE UP (ref 0.2–1.2)
BUN SERPL-MCNC: 20 MG/DL — SIGNIFICANT CHANGE UP (ref 7–23)
CALCIUM SERPL-MCNC: 9.4 MG/DL — SIGNIFICANT CHANGE UP (ref 8.4–10.5)
CHLORIDE SERPL-SCNC: 101 MMOL/L — SIGNIFICANT CHANGE UP (ref 96–108)
CO2 SERPL-SCNC: 27 MMOL/L — SIGNIFICANT CHANGE UP (ref 22–31)
CREAT SERPL-MCNC: 0.95 MG/DL — SIGNIFICANT CHANGE UP (ref 0.5–1.3)
EOSINOPHIL NFR BLD AUTO: 1.5 % — SIGNIFICANT CHANGE UP (ref 0–6)
GLUCOSE SERPL-MCNC: 98 MG/DL — SIGNIFICANT CHANGE UP (ref 70–99)
HCT VFR BLD CALC: 34.3 % — LOW (ref 34.5–45)
HGB BLD-MCNC: 11 G/DL — LOW (ref 11.5–15.5)
LYMPHOCYTES # BLD AUTO: 25.5 % — SIGNIFICANT CHANGE UP (ref 13–44)
MCHC RBC-ENTMCNC: 26.8 PG — LOW (ref 27–34)
MCHC RBC-ENTMCNC: 32.1 G/DL — SIGNIFICANT CHANGE UP (ref 32–36)
MCV RBC AUTO: 83.5 FL — SIGNIFICANT CHANGE UP (ref 80–100)
MONOCYTES NFR BLD AUTO: 7.8 % — SIGNIFICANT CHANGE UP (ref 2–14)
NEUTROPHILS NFR BLD AUTO: 64.9 % — SIGNIFICANT CHANGE UP (ref 43–77)
PLATELET # BLD AUTO: 145 K/UL — LOW (ref 150–400)
POTASSIUM SERPL-MCNC: 4.4 MMOL/L — SIGNIFICANT CHANGE UP (ref 3.5–5.3)
POTASSIUM SERPL-SCNC: 4.4 MMOL/L — SIGNIFICANT CHANGE UP (ref 3.5–5.3)
PROT SERPL-MCNC: 8.1 G/DL — SIGNIFICANT CHANGE UP (ref 6–8.3)
RBC # BLD: 4.11 M/UL — SIGNIFICANT CHANGE UP (ref 3.8–5.2)
RBC # FLD: 16.3 % — SIGNIFICANT CHANGE UP (ref 10.3–16.9)
SODIUM SERPL-SCNC: 140 MMOL/L — SIGNIFICANT CHANGE UP (ref 135–145)
WBC # BLD: 4 K/UL — SIGNIFICANT CHANGE UP (ref 3.8–10.5)
WBC # FLD AUTO: 4 K/UL — SIGNIFICANT CHANGE UP (ref 3.8–10.5)

## 2018-02-22 PROCEDURE — 99215 OFFICE O/P EST HI 40 MIN: CPT | Mod: GC

## 2018-02-22 RX ORDER — FAMOTIDINE 20 MG/1
20 TABLET, FILM COATED ORAL
Qty: 30 | Refills: 0 | Status: DISCONTINUED | COMMUNITY
Start: 2017-11-29 | End: 2018-02-22

## 2018-02-23 PROBLEM — B49 FUNGAL INFECTION: Status: ACTIVE | Noted: 2018-02-22

## 2018-02-23 LAB
4/8 RATIO: 0.18 RATIO — LOW (ref 0.9–3.6)
ABS CD8: 730 /UL — SIGNIFICANT CHANGE UP (ref 136–757)
CD3 BLASTS SPEC-ACNC: 882 /UL — SIGNIFICANT CHANGE UP (ref 799–2171)
CD3 BLASTS SPEC-ACNC: 90 % — HIGH (ref 59–85)
CD4 %: 13 % — LOW (ref 36–65)
CD8 %: 74 % — HIGH (ref 11–36)
HIV-1 VIRAL LOAD RESULT: (no result)
HIV1 RNA # SERPL NAA+PROBE: 70 — SIGNIFICANT CHANGE UP
HIV1 RNA SER-IMP: SIGNIFICANT CHANGE UP
HIV1 RNA SERPL NAA+PROBE-ACNC: (no result)
HIV1 RNA SERPL NAA+PROBE-LOG#: 1.85 — SIGNIFICANT CHANGE UP
T PALLIDUM AB TITR SER: NEGATIVE — SIGNIFICANT CHANGE UP
T-CELL CD4 SUBSET PNL BLD: 128 /UL — LOW (ref 489–1457)

## 2018-03-05 DIAGNOSIS — Z00.00 ENCOUNTER FOR GENERAL ADULT MEDICAL EXAMINATION WITHOUT ABNORMAL FINDINGS: ICD-10-CM

## 2018-03-05 DIAGNOSIS — B49 UNSPECIFIED MYCOSIS: ICD-10-CM

## 2018-03-05 DIAGNOSIS — K20.9 ESOPHAGITIS, UNSPECIFIED: ICD-10-CM

## 2018-03-05 DIAGNOSIS — B20 HUMAN IMMUNODEFICIENCY VIRUS [HIV] DISEASE: ICD-10-CM

## 2018-03-05 DIAGNOSIS — B19.20 UNSPECIFIED VIRAL HEPATITIS C WITHOUT HEPATIC COMA: ICD-10-CM

## 2018-03-15 ENCOUNTER — APPOINTMENT (OUTPATIENT)
Dept: INFECTIOUS DISEASE | Facility: CLINIC | Age: 58
End: 2018-03-15
Payer: MEDICARE

## 2018-03-15 ENCOUNTER — OUTPATIENT (OUTPATIENT)
Dept: OUTPATIENT SERVICES | Facility: HOSPITAL | Age: 58
LOS: 1 days | End: 2018-03-15

## 2018-03-15 VITALS
SYSTOLIC BLOOD PRESSURE: 95 MMHG | OXYGEN SATURATION: 92 % | BODY MASS INDEX: 16.48 KG/M2 | HEART RATE: 73 BPM | DIASTOLIC BLOOD PRESSURE: 63 MMHG | WEIGHT: 96 LBS | RESPIRATION RATE: 13 BRPM

## 2018-03-15 LAB
ALBUMIN SERPL ELPH-MCNC: 3.5 G/DL — SIGNIFICANT CHANGE UP (ref 3.3–5)
ALP SERPL-CCNC: 79 U/L — SIGNIFICANT CHANGE UP (ref 40–120)
ALT FLD-CCNC: 27 U/L — SIGNIFICANT CHANGE UP (ref 10–45)
ANION GAP SERPL CALC-SCNC: 11 MMOL/L — SIGNIFICANT CHANGE UP (ref 5–17)
AST SERPL-CCNC: 36 U/L — SIGNIFICANT CHANGE UP (ref 10–40)
BASOPHILS NFR BLD AUTO: 0.4 % — SIGNIFICANT CHANGE UP (ref 0–2)
BILIRUB SERPL-MCNC: <0.2 MG/DL — SIGNIFICANT CHANGE UP (ref 0.2–1.2)
BUN SERPL-MCNC: 19 MG/DL — SIGNIFICANT CHANGE UP (ref 7–23)
CALCIUM SERPL-MCNC: 8.9 MG/DL — SIGNIFICANT CHANGE UP (ref 8.4–10.5)
CHLORIDE SERPL-SCNC: 102 MMOL/L — SIGNIFICANT CHANGE UP (ref 96–108)
CO2 SERPL-SCNC: 25 MMOL/L — SIGNIFICANT CHANGE UP (ref 22–31)
CREAT SERPL-MCNC: 1.08 MG/DL — SIGNIFICANT CHANGE UP (ref 0.5–1.3)
EOSINOPHIL NFR BLD AUTO: 3.5 % — SIGNIFICANT CHANGE UP (ref 0–6)
GLUCOSE SERPL-MCNC: 120 MG/DL — HIGH (ref 70–99)
HCT VFR BLD CALC: 30.8 % — LOW (ref 34.5–45)
HGB BLD-MCNC: 10 G/DL — LOW (ref 11.5–15.5)
LYMPHOCYTES # BLD AUTO: 34.3 % — SIGNIFICANT CHANGE UP (ref 13–44)
MCHC RBC-ENTMCNC: 27.2 PG — SIGNIFICANT CHANGE UP (ref 27–34)
MCHC RBC-ENTMCNC: 32.5 G/DL — SIGNIFICANT CHANGE UP (ref 32–36)
MCV RBC AUTO: 83.7 FL — SIGNIFICANT CHANGE UP (ref 80–100)
MONOCYTES NFR BLD AUTO: 9.6 % — SIGNIFICANT CHANGE UP (ref 2–14)
NEUTROPHILS NFR BLD AUTO: 52.2 % — SIGNIFICANT CHANGE UP (ref 43–77)
PLATELET # BLD AUTO: 165 K/UL — SIGNIFICANT CHANGE UP (ref 150–400)
POTASSIUM SERPL-MCNC: 3.6 MMOL/L — SIGNIFICANT CHANGE UP (ref 3.5–5.3)
POTASSIUM SERPL-SCNC: 3.6 MMOL/L — SIGNIFICANT CHANGE UP (ref 3.5–5.3)
PROT SERPL-MCNC: 7.2 G/DL — SIGNIFICANT CHANGE UP (ref 6–8.3)
RBC # BLD: 3.68 M/UL — LOW (ref 3.8–5.2)
RBC # FLD: 15.7 % — SIGNIFICANT CHANGE UP (ref 10.3–16.9)
SODIUM SERPL-SCNC: 138 MMOL/L — SIGNIFICANT CHANGE UP (ref 135–145)
WBC # BLD: 2.3 K/UL — LOW (ref 3.8–10.5)
WBC # FLD AUTO: 2.3 K/UL — LOW (ref 3.8–10.5)

## 2018-03-15 PROCEDURE — 99214 OFFICE O/P EST MOD 30 MIN: CPT | Mod: GC

## 2018-03-15 RX ORDER — CLOTRIMAZOLE 1% ANTIFUNGAL CREAM 1 G/100G
1 CREAM TOPICAL
Qty: 10 | Refills: 0 | Status: DISCONTINUED | COMMUNITY
Start: 2018-02-22 | End: 2018-03-15

## 2018-03-17 LAB
HIV-1 VIRAL LOAD RESULT: (no result)
HIV1 RNA # SERPL NAA+PROBE: SIGNIFICANT CHANGE UP
HIV1 RNA SER-IMP: SIGNIFICANT CHANGE UP
HIV1 RNA SERPL NAA+PROBE-ACNC: (no result)
HIV1 RNA SERPL NAA+PROBE-LOG#: (no result) LG COP/ML

## 2018-03-19 ENCOUNTER — APPOINTMENT (OUTPATIENT)
Dept: GASTROENTEROLOGY | Facility: CLINIC | Age: 58
End: 2018-03-19

## 2018-03-19 LAB
4/8 RATIO: 0.2 RATIO — LOW (ref 0.9–3.6)
ABS CD8: 624 /UL — SIGNIFICANT CHANGE UP (ref 136–757)
CD3 BLASTS SPEC-ACNC: 764 /UL — LOW (ref 799–2171)
CD3 BLASTS SPEC-ACNC: 91 % — HIGH (ref 59–85)
CD4 %: 14 % — LOW (ref 36–65)
CD8 %: 74 % — HIGH (ref 11–36)
T-CELL CD4 SUBSET PNL BLD: 122 /UL — LOW (ref 489–1457)

## 2018-03-28 DIAGNOSIS — K20.9 ESOPHAGITIS, UNSPECIFIED: ICD-10-CM

## 2018-03-28 DIAGNOSIS — B20 HUMAN IMMUNODEFICIENCY VIRUS [HIV] DISEASE: ICD-10-CM

## 2018-03-28 DIAGNOSIS — B19.20 UNSPECIFIED VIRAL HEPATITIS C WITHOUT HEPATIC COMA: ICD-10-CM

## 2018-04-12 ENCOUNTER — APPOINTMENT (OUTPATIENT)
Dept: INFECTIOUS DISEASE | Facility: CLINIC | Age: 58
End: 2018-04-12

## 2018-04-17 ENCOUNTER — OUTPATIENT (OUTPATIENT)
Dept: OUTPATIENT SERVICES | Facility: HOSPITAL | Age: 58
LOS: 1 days | End: 2018-04-17

## 2018-04-17 ENCOUNTER — APPOINTMENT (OUTPATIENT)
Dept: INFECTIOUS DISEASE | Facility: CLINIC | Age: 58
End: 2018-04-17
Payer: MEDICARE

## 2018-04-17 VITALS
BODY MASS INDEX: 15.28 KG/M2 | OXYGEN SATURATION: 92 % | HEART RATE: 86 BPM | RESPIRATION RATE: 16 BRPM | WEIGHT: 89 LBS | SYSTOLIC BLOOD PRESSURE: 122 MMHG | DIASTOLIC BLOOD PRESSURE: 72 MMHG

## 2018-04-17 DIAGNOSIS — K20.9 ESOPHAGITIS, UNSPECIFIED: ICD-10-CM

## 2018-04-17 DIAGNOSIS — G62.9 POLYNEUROPATHY, UNSPECIFIED: ICD-10-CM

## 2018-04-17 LAB
ALBUMIN SERPL ELPH-MCNC: 3.9 G/DL — SIGNIFICANT CHANGE UP (ref 3.3–5)
ALP SERPL-CCNC: 90 U/L — SIGNIFICANT CHANGE UP (ref 40–120)
ALT FLD-CCNC: 15 U/L — SIGNIFICANT CHANGE UP (ref 10–45)
ANION GAP SERPL CALC-SCNC: 13 MMOL/L — SIGNIFICANT CHANGE UP (ref 5–17)
AST SERPL-CCNC: 32 U/L — SIGNIFICANT CHANGE UP (ref 10–40)
BASOPHILS NFR BLD AUTO: 0.1 % — SIGNIFICANT CHANGE UP (ref 0–2)
BILIRUB SERPL-MCNC: 0.3 MG/DL — SIGNIFICANT CHANGE UP (ref 0.2–1.2)
BUN SERPL-MCNC: 23 MG/DL — SIGNIFICANT CHANGE UP (ref 7–23)
CALCIUM SERPL-MCNC: 9.2 MG/DL — SIGNIFICANT CHANGE UP (ref 8.4–10.5)
CHLORIDE SERPL-SCNC: 100 MMOL/L — SIGNIFICANT CHANGE UP (ref 96–108)
CO2 SERPL-SCNC: 24 MMOL/L — SIGNIFICANT CHANGE UP (ref 22–31)
CREAT SERPL-MCNC: 1.31 MG/DL — HIGH (ref 0.5–1.3)
EOSINOPHIL NFR BLD AUTO: 0.9 % — SIGNIFICANT CHANGE UP (ref 0–6)
GLUCOSE SERPL-MCNC: 85 MG/DL — SIGNIFICANT CHANGE UP (ref 70–99)
HCT VFR BLD CALC: 35.9 % — SIGNIFICANT CHANGE UP (ref 34.5–45)
HGB BLD-MCNC: 11.3 G/DL — LOW (ref 11.5–15.5)
LYMPHOCYTES # BLD AUTO: 11.5 % — LOW (ref 13–44)
MCHC RBC-ENTMCNC: 27 PG — SIGNIFICANT CHANGE UP (ref 27–34)
MCHC RBC-ENTMCNC: 31.5 G/DL — LOW (ref 32–36)
MCV RBC AUTO: 85.7 FL — SIGNIFICANT CHANGE UP (ref 80–100)
MONOCYTES NFR BLD AUTO: 5.5 % — SIGNIFICANT CHANGE UP (ref 2–14)
NEUTROPHILS NFR BLD AUTO: 82 % — HIGH (ref 43–77)
PLATELET # BLD AUTO: 193 K/UL — SIGNIFICANT CHANGE UP (ref 150–400)
POTASSIUM SERPL-MCNC: 4.2 MMOL/L — SIGNIFICANT CHANGE UP (ref 3.5–5.3)
POTASSIUM SERPL-SCNC: 4.2 MMOL/L — SIGNIFICANT CHANGE UP (ref 3.5–5.3)
PROT SERPL-MCNC: 8.6 G/DL — HIGH (ref 6–8.3)
RBC # BLD: 4.19 M/UL — SIGNIFICANT CHANGE UP (ref 3.8–5.2)
RBC # FLD: 14.4 % — SIGNIFICANT CHANGE UP (ref 10.3–16.9)
SODIUM SERPL-SCNC: 137 MMOL/L — SIGNIFICANT CHANGE UP (ref 135–145)
WBC # BLD: 6.9 K/UL — SIGNIFICANT CHANGE UP (ref 3.8–10.5)
WBC # FLD AUTO: 6.9 K/UL — SIGNIFICANT CHANGE UP (ref 3.8–10.5)

## 2018-04-17 PROCEDURE — 99214 OFFICE O/P EST MOD 30 MIN: CPT | Mod: GC

## 2018-04-17 RX ORDER — FLUCONAZOLE 200 MG/1
200 TABLET ORAL
Qty: 1 | Refills: 0 | Status: DISCONTINUED | COMMUNITY
Start: 2018-02-22 | End: 2018-04-17

## 2018-04-17 RX ORDER — FLUCONAZOLE 100 MG/1
100 TABLET ORAL DAILY
Qty: 14 | Refills: 0 | Status: DISCONTINUED | COMMUNITY
Start: 2018-02-22 | End: 2018-04-17

## 2018-04-17 RX ORDER — AZITHROMYCIN 600 MG/1
600 TABLET, FILM COATED ORAL
Qty: 8 | Refills: 5 | Status: DISCONTINUED | COMMUNITY
Start: 2018-02-22 | End: 2018-04-17

## 2018-04-19 LAB
4/8 RATIO: 0.2 RATIO — LOW (ref 0.9–3.6)
ABS CD8: 532 /UL — SIGNIFICANT CHANGE UP (ref 136–757)
CD3 BLASTS SPEC-ACNC: 649 /UL — LOW (ref 799–2171)
CD3 BLASTS SPEC-ACNC: 82 % — SIGNIFICANT CHANGE UP (ref 59–85)
CD4 %: 14 % — LOW (ref 36–65)
CD8 %: 67 % — HIGH (ref 11–36)
HIV-1 VIRAL LOAD RESULT: (no result)
HIV1 RNA # SERPL NAA+PROBE: SIGNIFICANT CHANGE UP
HIV1 RNA SER-IMP: SIGNIFICANT CHANGE UP
HIV1 RNA SERPL NAA+PROBE-ACNC: (no result)
HIV1 RNA SERPL NAA+PROBE-LOG#: (no result) LG COP/ML
T-CELL CD4 SUBSET PNL BLD: 107 /UL — LOW (ref 489–1457)

## 2018-04-19 RX ORDER — NAPROXEN 500 MG/1
500 TABLET ORAL
Qty: 30 | Refills: 1 | Status: DISCONTINUED | COMMUNITY
Start: 2018-04-17 | End: 2018-04-19

## 2018-04-23 ENCOUNTER — APPOINTMENT (OUTPATIENT)
Dept: GASTROENTEROLOGY | Facility: CLINIC | Age: 58
End: 2018-04-23

## 2018-04-26 DIAGNOSIS — B20 HUMAN IMMUNODEFICIENCY VIRUS [HIV] DISEASE: ICD-10-CM

## 2018-04-26 DIAGNOSIS — M25.542 PAIN IN JOINTS OF LEFT HAND: ICD-10-CM

## 2018-04-26 DIAGNOSIS — M25.541 PAIN IN JOINTS OF RIGHT HAND: ICD-10-CM

## 2018-04-26 DIAGNOSIS — G62.9 POLYNEUROPATHY, UNSPECIFIED: ICD-10-CM

## 2018-04-26 DIAGNOSIS — K20.9 ESOPHAGITIS, UNSPECIFIED: ICD-10-CM

## 2018-05-30 ENCOUNTER — APPOINTMENT (OUTPATIENT)
Dept: INTERVENTIONAL RADIOLOGY/VASCULAR | Facility: HOSPITAL | Age: 58
End: 2018-05-30

## 2018-07-09 ENCOUNTER — APPOINTMENT (OUTPATIENT)
Dept: GASTROENTEROLOGY | Facility: CLINIC | Age: 58
End: 2018-07-09

## 2018-07-19 ENCOUNTER — APPOINTMENT (OUTPATIENT)
Dept: INFECTIOUS DISEASE | Facility: CLINIC | Age: 58
End: 2018-07-19

## 2018-07-23 PROBLEM — Z78.9 ALCOHOL USE: Status: ACTIVE | Noted: 2017-07-10

## 2018-08-07 ENCOUNTER — OUTPATIENT (OUTPATIENT)
Dept: OUTPATIENT SERVICES | Facility: HOSPITAL | Age: 58
LOS: 1 days | End: 2018-08-07

## 2018-08-07 ENCOUNTER — APPOINTMENT (OUTPATIENT)
Dept: INFECTIOUS DISEASE | Facility: CLINIC | Age: 58
End: 2018-08-07
Payer: MEDICARE

## 2018-08-07 VITALS
OXYGEN SATURATION: 97 % | HEART RATE: 71 BPM | WEIGHT: 94 LBS | BODY MASS INDEX: 16.14 KG/M2 | RESPIRATION RATE: 12 BRPM | TEMPERATURE: 97.7 F | DIASTOLIC BLOOD PRESSURE: 70 MMHG | SYSTOLIC BLOOD PRESSURE: 109 MMHG

## 2018-08-07 DIAGNOSIS — Z12.31 ENCOUNTER FOR SCREENING MAMMOGRAM FOR MALIGNANT NEOPLASM OF BREAST: ICD-10-CM

## 2018-08-07 DIAGNOSIS — Z91.89 OTHER SPECIFIED PERSONAL RISK FACTORS, NOT ELSEWHERE CLASSIFIED: ICD-10-CM

## 2018-08-07 DIAGNOSIS — Z23 ENCOUNTER FOR IMMUNIZATION: ICD-10-CM

## 2018-08-07 DIAGNOSIS — Z12.12 ENCOUNTER FOR SCREENING FOR MALIGNANT NEOPLASM OF COLON: ICD-10-CM

## 2018-08-07 DIAGNOSIS — Z12.11 ENCOUNTER FOR SCREENING FOR MALIGNANT NEOPLASM OF COLON: ICD-10-CM

## 2018-08-07 LAB
ALBUMIN SERPL ELPH-MCNC: 3.5 G/DL — SIGNIFICANT CHANGE UP (ref 3.3–5)
ALP SERPL-CCNC: 105 U/L — SIGNIFICANT CHANGE UP (ref 40–120)
ALT FLD-CCNC: 19 U/L — SIGNIFICANT CHANGE UP (ref 10–45)
ANION GAP SERPL CALC-SCNC: 13 MMOL/L — SIGNIFICANT CHANGE UP (ref 5–17)
AST SERPL-CCNC: 26 U/L — SIGNIFICANT CHANGE UP (ref 10–40)
BASOPHILS NFR BLD AUTO: 0.3 % — SIGNIFICANT CHANGE UP (ref 0–2)
BILIRUB SERPL-MCNC: 0.3 MG/DL — SIGNIFICANT CHANGE UP (ref 0.2–1.2)
BUN SERPL-MCNC: 23 MG/DL — SIGNIFICANT CHANGE UP (ref 7–23)
CALCIUM SERPL-MCNC: 9.2 MG/DL — SIGNIFICANT CHANGE UP (ref 8.4–10.5)
CHLORIDE SERPL-SCNC: 99 MMOL/L — SIGNIFICANT CHANGE UP (ref 96–108)
CO2 SERPL-SCNC: 25 MMOL/L — SIGNIFICANT CHANGE UP (ref 22–31)
CREAT SERPL-MCNC: 1.52 MG/DL — HIGH (ref 0.5–1.3)
EOSINOPHIL NFR BLD AUTO: 4 % — SIGNIFICANT CHANGE UP (ref 0–6)
GLUCOSE SERPL-MCNC: 114 MG/DL — HIGH (ref 70–99)
HCT VFR BLD CALC: 32.4 % — LOW (ref 34.5–45)
HGB BLD-MCNC: 10.1 G/DL — LOW (ref 11.5–15.5)
LYMPHOCYTES # BLD AUTO: 29.3 % — SIGNIFICANT CHANGE UP (ref 13–44)
MCHC RBC-ENTMCNC: 24.9 PG — LOW (ref 27–34)
MCHC RBC-ENTMCNC: 31.2 G/DL — LOW (ref 32–36)
MCV RBC AUTO: 80 FL — SIGNIFICANT CHANGE UP (ref 80–100)
MONOCYTES NFR BLD AUTO: 6.3 % — SIGNIFICANT CHANGE UP (ref 2–14)
NEUTROPHILS NFR BLD AUTO: 60.1 % — SIGNIFICANT CHANGE UP (ref 43–77)
PLATELET # BLD AUTO: 223 K/UL — SIGNIFICANT CHANGE UP (ref 150–400)
POTASSIUM SERPL-MCNC: 4.1 MMOL/L — SIGNIFICANT CHANGE UP (ref 3.5–5.3)
POTASSIUM SERPL-SCNC: 4.1 MMOL/L — SIGNIFICANT CHANGE UP (ref 3.5–5.3)
PROT SERPL-MCNC: 8.1 G/DL — SIGNIFICANT CHANGE UP (ref 6–8.3)
RBC # BLD: 4.05 M/UL — SIGNIFICANT CHANGE UP (ref 3.8–5.2)
RBC # FLD: 13.1 % — SIGNIFICANT CHANGE UP (ref 10.3–16.9)
SODIUM SERPL-SCNC: 137 MMOL/L — SIGNIFICANT CHANGE UP (ref 135–145)
WBC # BLD: 3.5 K/UL — LOW (ref 3.8–10.5)
WBC # FLD AUTO: 3.5 K/UL — LOW (ref 3.8–10.5)

## 2018-08-07 PROCEDURE — 99213 OFFICE O/P EST LOW 20 MIN: CPT | Mod: GE

## 2018-08-08 LAB
4/8 RATIO: 0.23 RATIO — LOW (ref 0.9–3.6)
ABS CD8: 643 /UL — SIGNIFICANT CHANGE UP (ref 136–757)
CD3 BLASTS SPEC-ACNC: 813 /UL — SIGNIFICANT CHANGE UP (ref 799–2171)
CD3 BLASTS SPEC-ACNC: 88 % — HIGH (ref 59–85)
CD4 %: 16 % — LOW (ref 36–65)
CD8 %: 70 % — HIGH (ref 11–36)
T-CELL CD4 SUBSET PNL BLD: 150 /UL — LOW (ref 489–1457)

## 2018-08-08 NOTE — HISTORY OF PRESENT ILLNESS
[de-identified] : 59 yo F with HIV (CD4 107, VL <30 April 2018), HCV, IVDA on methadone, seizure disorder, asthma, active smoker who presents for follow up. Pt reports compliance to her medications, has not missed doses. Pt has not gone for EGD for her esophagitis but symptoms have resolved. She reports not using any IV drugs for the past 2 weeks, on methadone 60mg daily. Patient also reports to a seizure a couple of weeks ago witnessed by her friend, described as "tense body", but she does not remember what happened. She is still taking Keppra 500mg BID but has thinks she needs higher dose but has not made appointment with neurology, saw Dr. Portillo previously.\par \par

## 2018-08-08 NOTE — PHYSICAL EXAM
[No Acute Distress] : no acute distress [Well-Appearing] : well-appearing [Normal Sclera/Conjunctiva] : normal sclera/conjunctiva [PERRL] : pupils equal round and reactive to light [EOMI] : extraocular movements intact [Normal Outer Ear/Nose] : the outer ears and nose were normal in appearance [Normal Oropharynx] : the oropharynx was normal [Normal TMs] : both tympanic membranes were normal [No JVD] : no jugular venous distention [Supple] : supple [No Lymphadenopathy] : no lymphadenopathy [Thyroid Normal, No Nodules] : the thyroid was normal and there were no nodules present [No Respiratory Distress] : no respiratory distress  [Clear to Auscultation] : lungs were clear to auscultation bilaterally [No Accessory Muscle Use] : no accessory muscle use [Normal Rate] : normal rate  [Regular Rhythm] : with a regular rhythm [Normal S1, S2] : normal S1 and S2 [No Murmur] : no murmur heard [No Varicosities] : no varicosities [Pedal Pulses Present] : the pedal pulses are present [No Edema] : there was no peripheral edema [No Extremity Clubbing/Cyanosis] : no extremity clubbing/cyanosis [Soft] : abdomen soft [Non Tender] : non-tender [Non-distended] : non-distended [No Masses] : no abdominal mass palpated [No HSM] : no HSM [Normal Bowel Sounds] : normal bowel sounds [No CVA Tenderness] : no CVA  tenderness [No Rash] : no rash [Normal Gait] : normal gait [Normal Affect] : the affect was normal [Alert and Oriented x3] : oriented to person, place, and time [Normal Insight/Judgement] : insight and judgment were intact [de-identified] : thin female, in no acute distress

## 2018-08-08 NOTE — ASSESSMENT
[FreeTextEntry1] : 57 yo F with HIV (CD4 128, VL 70) on Genvoya, HCV, IVDA on methadone, seizure disorder, asthma, active smoker who presents for follow up.\par \par # HIV/AIDS - CD4 107, VL <30 in April 2018, now on Genvoya\par - genotype in 2006 was pan-sensitive; genotype in 2013 with [id number], repeat genotype pansensitive\par - c/w Genvoya\par - c/w bactrim suspension qd for PCP ppx \par - repeat labs today\par \par # Esophagitis - resolved s/p 1 month fluconazole\par - encourage follow up with GI for endoscopy\par \par # HCV - VL 478K in Dec 2017, likely from IVDU\par - will need to obtain genotype prior to starting treatment - currently is not candidate for treatment given active drug use and non compliance with meds \par - APRI score 0.2 likely secondary to falsely low AST\par - abdominal U/S (12/25/17): heterogeneous liver parenchyma, without a focal space-occupying lesion, Hepatosplenomegaly, small amount of perihepatic ascites\par \par # IVDU on Methadone: reports being clean for 2 weeks\par - assess next visit, if pt denies further IV drug use, would check Utox for consideration for hep C treatment\par - c/w methadone\par \par # Seizure Disorder \par - on keppra 500mg BID \par - Neurology referral placed, pt will call to make appointment\par \par # Tobacco cessation: smokes about 1 cigarette "here and there"\par - c/w nicotine patch to 21mg and prescribed gum \par \par # Asthma \par - likely also has underlying component of COPD as well given significant smoking history \par - for now, c/w advair BID - symptoms controlled - should consider adding spiriva if symptoms not controlled \par - has not been using proair\par \par # GERD \par - c/w omeprazole 20mg qd \par \par # HCM \par - Pneumovax administered today \par - Defer Shingrix until CD4>200\par - needs mammo, PAP\par - Pt refused C-scope, agreeable to FIT, kit given\par \par RTC in 2 month for repeat labs.

## 2018-08-09 DIAGNOSIS — Z00.00 ENCOUNTER FOR GENERAL ADULT MEDICAL EXAMINATION WITHOUT ABNORMAL FINDINGS: ICD-10-CM

## 2018-08-09 DIAGNOSIS — R56.9 UNSPECIFIED CONVULSIONS: ICD-10-CM

## 2018-08-09 DIAGNOSIS — Z23 ENCOUNTER FOR IMMUNIZATION: ICD-10-CM

## 2018-08-09 DIAGNOSIS — B20 HUMAN IMMUNODEFICIENCY VIRUS [HIV] DISEASE: ICD-10-CM

## 2018-08-09 DIAGNOSIS — B19.20 UNSPECIFIED VIRAL HEPATITIS C WITHOUT HEPATIC COMA: ICD-10-CM

## 2018-08-09 LAB
GAMMA INTERFERON BACKGROUND BLD IA-ACNC: 0.1 IU/ML — SIGNIFICANT CHANGE UP
HIV-1 VIRAL LOAD RESULT: ABNORMAL
HIV1 RNA # SERPL NAA+PROBE: SIGNIFICANT CHANGE UP
HIV1 RNA SER-IMP: SIGNIFICANT CHANGE UP
HIV1 RNA SERPL NAA+PROBE-ACNC: ABNORMAL
HIV1 RNA SERPL NAA+PROBE-LOG#: ABNORMAL LG COP/ML
M TB IFN-G BLD-IMP: NEGATIVE — SIGNIFICANT CHANGE UP
M TB IFN-G CD4+ BCKGRND COR BLD-ACNC: 0.01 IU/ML — SIGNIFICANT CHANGE UP
M TB IFN-G CD4+CD8+ BCKGRND COR BLD-ACNC: 0.01 IU/ML — SIGNIFICANT CHANGE UP
QUANT TB PLUS MITOGEN MINUS NIL: >10 IU/ML — SIGNIFICANT CHANGE UP

## 2018-08-14 ENCOUNTER — APPOINTMENT (OUTPATIENT)
Dept: INFECTIOUS DISEASE | Facility: CLINIC | Age: 58
End: 2018-08-14

## 2018-09-24 ENCOUNTER — APPOINTMENT (OUTPATIENT)
Dept: MAMMOGRAPHY | Facility: HOSPITAL | Age: 58
End: 2018-09-24

## 2018-10-09 ENCOUNTER — OUTPATIENT (OUTPATIENT)
Dept: OUTPATIENT SERVICES | Facility: HOSPITAL | Age: 58
LOS: 1 days | End: 2018-10-09

## 2018-10-09 ENCOUNTER — APPOINTMENT (OUTPATIENT)
Dept: INFECTIOUS DISEASE | Facility: CLINIC | Age: 58
End: 2018-10-09
Payer: MEDICARE

## 2018-10-09 VITALS
SYSTOLIC BLOOD PRESSURE: 100 MMHG | WEIGHT: 94 LBS | BODY MASS INDEX: 16.05 KG/M2 | TEMPERATURE: 97.4 F | OXYGEN SATURATION: 95 % | HEART RATE: 70 BPM | RESPIRATION RATE: 12 BRPM | DIASTOLIC BLOOD PRESSURE: 70 MMHG | HEIGHT: 64 IN

## 2018-10-09 DIAGNOSIS — R79.89 OTHER SPECIFIED ABNORMAL FINDINGS OF BLOOD CHEMISTRY: ICD-10-CM

## 2018-10-09 DIAGNOSIS — J18.9 PNEUMONIA, UNSPECIFIED ORGANISM: ICD-10-CM

## 2018-10-09 LAB
ALBUMIN SERPL ELPH-MCNC: 3.4 G/DL — SIGNIFICANT CHANGE UP (ref 3.3–5)
ALP SERPL-CCNC: 139 U/L — HIGH (ref 40–120)
ALT FLD-CCNC: 17 U/L — SIGNIFICANT CHANGE UP (ref 10–45)
ANION GAP SERPL CALC-SCNC: 11 MMOL/L — SIGNIFICANT CHANGE UP (ref 5–17)
APPEARANCE UR: CLEAR — SIGNIFICANT CHANGE UP
AST SERPL-CCNC: 37 U/L — SIGNIFICANT CHANGE UP (ref 10–40)
BASOPHILS NFR BLD AUTO: 0.2 % — SIGNIFICANT CHANGE UP (ref 0–2)
BILIRUB SERPL-MCNC: 0.2 MG/DL — SIGNIFICANT CHANGE UP (ref 0.2–1.2)
BILIRUB UR-MCNC: NEGATIVE — SIGNIFICANT CHANGE UP
BUN SERPL-MCNC: 18 MG/DL — SIGNIFICANT CHANGE UP (ref 7–23)
CALCIUM SERPL-MCNC: 9.3 MG/DL — SIGNIFICANT CHANGE UP (ref 8.4–10.5)
CHLORIDE SERPL-SCNC: 100 MMOL/L — SIGNIFICANT CHANGE UP (ref 96–108)
CO2 SERPL-SCNC: 27 MMOL/L — SIGNIFICANT CHANGE UP (ref 22–31)
COLOR SPEC: YELLOW — SIGNIFICANT CHANGE UP
CREAT ?TM UR-MCNC: 112 MG/DL — SIGNIFICANT CHANGE UP
CREAT SERPL-MCNC: 1.11 MG/DL — SIGNIFICANT CHANGE UP (ref 0.5–1.3)
DIFF PNL FLD: NEGATIVE — SIGNIFICANT CHANGE UP
EOSINOPHIL NFR BLD AUTO: 3.4 % — SIGNIFICANT CHANGE UP (ref 0–6)
GLUCOSE SERPL-MCNC: 95 MG/DL — SIGNIFICANT CHANGE UP (ref 70–99)
GLUCOSE UR QL: NEGATIVE — SIGNIFICANT CHANGE UP
HCT VFR BLD CALC: 35.1 % — SIGNIFICANT CHANGE UP (ref 34.5–45)
HGB BLD-MCNC: 10.7 G/DL — LOW (ref 11.5–15.5)
KETONES UR-MCNC: NEGATIVE — SIGNIFICANT CHANGE UP
LEUKOCYTE ESTERASE UR-ACNC: NEGATIVE — SIGNIFICANT CHANGE UP
LYMPHOCYTES # BLD AUTO: 26.9 % — SIGNIFICANT CHANGE UP (ref 13–44)
MCHC RBC-ENTMCNC: 23.7 PG — LOW (ref 27–34)
MCHC RBC-ENTMCNC: 30.5 G/DL — LOW (ref 32–36)
MCV RBC AUTO: 77.8 FL — LOW (ref 80–100)
MICROALBUMIN UR-MCNC: 2.5 MG/DL — SIGNIFICANT CHANGE UP
MICROALBUMIN/CREAT UR-RTO: 22 MG/G — SIGNIFICANT CHANGE UP (ref 0–30)
MONOCYTES NFR BLD AUTO: 6.7 % — SIGNIFICANT CHANGE UP (ref 2–14)
NEUTROPHILS NFR BLD AUTO: 62.8 % — SIGNIFICANT CHANGE UP (ref 43–77)
NITRITE UR-MCNC: NEGATIVE — SIGNIFICANT CHANGE UP
PH UR: 6 — SIGNIFICANT CHANGE UP (ref 5–8)
PLATELET # BLD AUTO: 178 K/UL — SIGNIFICANT CHANGE UP (ref 150–400)
POTASSIUM SERPL-MCNC: 4.5 MMOL/L — SIGNIFICANT CHANGE UP (ref 3.5–5.3)
POTASSIUM SERPL-SCNC: 4.5 MMOL/L — SIGNIFICANT CHANGE UP (ref 3.5–5.3)
PROT SERPL-MCNC: 8.8 G/DL — HIGH (ref 6–8.3)
PROT UR-MCNC: ABNORMAL MG/DL
RBC # BLD: 4.51 M/UL — SIGNIFICANT CHANGE UP (ref 3.8–5.2)
RBC # FLD: 14.8 % — SIGNIFICANT CHANGE UP (ref 10.3–16.9)
SODIUM SERPL-SCNC: 138 MMOL/L — SIGNIFICANT CHANGE UP (ref 135–145)
SP GR SPEC: 1.02 — SIGNIFICANT CHANGE UP (ref 1–1.03)
UROBILINOGEN FLD QL: 0.2 E.U./DL — SIGNIFICANT CHANGE UP
WBC # BLD: 4.4 K/UL — SIGNIFICANT CHANGE UP (ref 3.8–10.5)
WBC # FLD AUTO: 4.4 K/UL — SIGNIFICANT CHANGE UP (ref 3.8–10.5)

## 2018-10-09 PROCEDURE — 99213 OFFICE O/P EST LOW 20 MIN: CPT | Mod: GE

## 2018-10-09 RX ORDER — ELVITEGRAVIR, COBICISTAT, EMTRICITABINE, AND TENOFOVIR ALAFENAMIDE 150; 150; 200; 10 MG/1; MG/1; MG/1; MG/1
150-150-200-10 TABLET ORAL DAILY
Qty: 90 | Refills: 3 | Status: DISCONTINUED | COMMUNITY
Start: 2018-03-15 | End: 2018-10-09

## 2018-10-09 RX ORDER — NICOTINE POLACRILEX 2 MG/1
2 GUM, CHEWING ORAL
Qty: 90 | Refills: 2 | Status: DISCONTINUED | COMMUNITY
Start: 2018-02-22 | End: 2018-10-09

## 2018-10-10 LAB
4/8 RATIO: 0.28 RATIO — LOW (ref 0.9–3.6)
ABS CD8: 803 /UL — HIGH (ref 142–740)
CD3 BLASTS SPEC-ACNC: 1069 /UL — SIGNIFICANT CHANGE UP (ref 672–1870)
CD3 BLASTS SPEC-ACNC: 88 % — HIGH (ref 59–83)
CD4 %: 19 % — LOW (ref 30–62)
CD8 %: 66 % — HIGH (ref 12–36)
HIV-1 VIRAL LOAD RESULT: ABNORMAL
HIV1 RNA # SERPL NAA+PROBE: SIGNIFICANT CHANGE UP
HIV1 RNA SER-IMP: SIGNIFICANT CHANGE UP
HIV1 RNA SERPL NAA+PROBE-ACNC: ABNORMAL
HIV1 RNA SERPL NAA+PROBE-LOG#: ABNORMAL LG COP/ML
T-CELL CD4 SUBSET PNL BLD: 226 /UL — LOW (ref 489–1457)

## 2018-10-11 PROBLEM — R79.89 CREATININE ELEVATION: Status: ACTIVE | Noted: 2018-10-09

## 2018-10-16 DIAGNOSIS — J44.9 CHRONIC OBSTRUCTIVE PULMONARY DISEASE, UNSPECIFIED: ICD-10-CM

## 2018-10-16 DIAGNOSIS — B19.20 UNSPECIFIED VIRAL HEPATITIS C WITHOUT HEPATIC COMA: ICD-10-CM

## 2018-10-16 DIAGNOSIS — R79.89 OTHER SPECIFIED ABNORMAL FINDINGS OF BLOOD CHEMISTRY: ICD-10-CM

## 2018-10-16 DIAGNOSIS — B20 HUMAN IMMUNODEFICIENCY VIRUS [HIV] DISEASE: ICD-10-CM

## 2018-10-31 ENCOUNTER — APPOINTMENT (OUTPATIENT)
Dept: NEUROLOGY | Facility: CLINIC | Age: 58
End: 2018-10-31

## 2018-12-06 ENCOUNTER — APPOINTMENT (OUTPATIENT)
Dept: INFECTIOUS DISEASE | Facility: CLINIC | Age: 58
End: 2018-12-06

## 2019-01-10 ENCOUNTER — OTHER (OUTPATIENT)
Age: 59
End: 2019-01-10

## 2019-01-24 ENCOUNTER — APPOINTMENT (OUTPATIENT)
Dept: INFECTIOUS DISEASE | Facility: CLINIC | Age: 59
End: 2019-01-24

## 2019-01-31 ENCOUNTER — OUTPATIENT (OUTPATIENT)
Dept: OUTPATIENT SERVICES | Facility: HOSPITAL | Age: 59
LOS: 1 days | End: 2019-01-31

## 2019-01-31 ENCOUNTER — APPOINTMENT (OUTPATIENT)
Dept: INFECTIOUS DISEASE | Facility: CLINIC | Age: 59
End: 2019-01-31

## 2019-01-31 ENCOUNTER — MEDICATION RENEWAL (OUTPATIENT)
Age: 59
End: 2019-01-31

## 2019-01-31 VITALS
SYSTOLIC BLOOD PRESSURE: 102 MMHG | RESPIRATION RATE: 14 BRPM | HEART RATE: 72 BPM | DIASTOLIC BLOOD PRESSURE: 72 MMHG | OXYGEN SATURATION: 95 % | TEMPERATURE: 98 F

## 2019-02-05 ENCOUNTER — MEDICATION RENEWAL (OUTPATIENT)
Age: 59
End: 2019-02-05

## 2019-02-06 DIAGNOSIS — B20 HUMAN IMMUNODEFICIENCY VIRUS [HIV] DISEASE: ICD-10-CM

## 2019-02-06 DIAGNOSIS — R59.0 LOCALIZED ENLARGED LYMPH NODES: ICD-10-CM

## 2019-02-06 DIAGNOSIS — R05 COUGH: ICD-10-CM

## 2019-03-06 ENCOUNTER — MEDICATION RENEWAL (OUTPATIENT)
Age: 59
End: 2019-03-06

## 2019-03-06 RX ORDER — AZITHROMYCIN 500 MG/1
500 TABLET, FILM COATED ORAL DAILY
Qty: 1 | Refills: 0 | Status: DISCONTINUED | COMMUNITY
Start: 2018-10-09 | End: 2019-03-06

## 2019-04-18 ENCOUNTER — APPOINTMENT (OUTPATIENT)
Dept: INFECTIOUS DISEASE | Facility: CLINIC | Age: 59
End: 2019-04-18

## 2019-05-17 ENCOUNTER — APPOINTMENT (OUTPATIENT)
Dept: INFECTIOUS DISEASE | Facility: CLINIC | Age: 59
End: 2019-05-17

## 2019-05-26 NOTE — PROGRESS NOTE ADULT - PROBLEM/PLAN-2
DISPLAY PLAN FREE TEXT
negative

## 2019-07-31 ENCOUNTER — MEDICATION RENEWAL (OUTPATIENT)
Age: 59
End: 2019-07-31

## 2019-08-07 ENCOUNTER — OTHER (OUTPATIENT)
Age: 59
End: 2019-08-07

## 2019-08-15 ENCOUNTER — OUTPATIENT (OUTPATIENT)
Dept: OUTPATIENT SERVICES | Facility: HOSPITAL | Age: 59
LOS: 1 days | End: 2019-08-15

## 2019-08-15 ENCOUNTER — APPOINTMENT (OUTPATIENT)
Dept: INFECTIOUS DISEASE | Facility: CLINIC | Age: 59
End: 2019-08-15
Payer: MEDICARE

## 2019-08-15 VITALS
HEIGHT: 64 IN | DIASTOLIC BLOOD PRESSURE: 89 MMHG | RESPIRATION RATE: 14 BRPM | OXYGEN SATURATION: 98 % | SYSTOLIC BLOOD PRESSURE: 150 MMHG | TEMPERATURE: 97 F | WEIGHT: 101 LBS | HEART RATE: 78 BPM | BODY MASS INDEX: 17.24 KG/M2

## 2019-08-15 DIAGNOSIS — R03.0 ELEVATED BLOOD-PRESSURE READING, W/OUT DIAGNOSIS OF HYPERTENSION: ICD-10-CM

## 2019-08-15 DIAGNOSIS — R59.0 LOCALIZED ENLARGED LYMPH NODES: ICD-10-CM

## 2019-08-15 DIAGNOSIS — Z87.09 PERSONAL HISTORY OF OTHER DISEASES OF THE RESPIRATORY SYSTEM: ICD-10-CM

## 2019-08-15 LAB
ALBUMIN SERPL ELPH-MCNC: 3.5 G/DL — SIGNIFICANT CHANGE UP (ref 3.3–5)
ALP SERPL-CCNC: 137 U/L — HIGH (ref 40–120)
ALT FLD-CCNC: 15 U/L — SIGNIFICANT CHANGE UP (ref 10–45)
ANION GAP SERPL CALC-SCNC: 9 MMOL/L — SIGNIFICANT CHANGE UP (ref 5–17)
APPEARANCE UR: CLEAR — SIGNIFICANT CHANGE UP
AST SERPL-CCNC: 25 U/L — SIGNIFICANT CHANGE UP (ref 10–40)
BASOPHILS # BLD AUTO: 0.02 K/UL — SIGNIFICANT CHANGE UP (ref 0–0.2)
BASOPHILS NFR BLD AUTO: 0.6 % — SIGNIFICANT CHANGE UP (ref 0–2)
BILIRUB SERPL-MCNC: 0.2 MG/DL — SIGNIFICANT CHANGE UP (ref 0.2–1.2)
BILIRUB UR-MCNC: NEGATIVE — SIGNIFICANT CHANGE UP
BUN SERPL-MCNC: 15 MG/DL — SIGNIFICANT CHANGE UP (ref 7–23)
CALCIUM SERPL-MCNC: 8.6 MG/DL — SIGNIFICANT CHANGE UP (ref 8.4–10.5)
CHLORIDE SERPL-SCNC: 107 MMOL/L — SIGNIFICANT CHANGE UP (ref 96–108)
CHOLEST SERPL-MCNC: 135 MG/DL — SIGNIFICANT CHANGE UP (ref 10–199)
CO2 SERPL-SCNC: 24 MMOL/L — SIGNIFICANT CHANGE UP (ref 22–31)
COLOR SPEC: YELLOW — SIGNIFICANT CHANGE UP
CREAT SERPL-MCNC: 0.87 MG/DL — SIGNIFICANT CHANGE UP (ref 0.5–1.3)
DIFF PNL FLD: NEGATIVE — SIGNIFICANT CHANGE UP
EOSINOPHIL # BLD AUTO: 0.17 K/UL — SIGNIFICANT CHANGE UP (ref 0–0.5)
EOSINOPHIL NFR BLD AUTO: 5 % — SIGNIFICANT CHANGE UP (ref 0–6)
ESTIMATED AVERAGE GLUCOSE: 120 MG/DL — HIGH (ref 68–114)
GLUCOSE SERPL-MCNC: 126 MG/DL — HIGH (ref 70–99)
GLUCOSE UR QL: NEGATIVE — SIGNIFICANT CHANGE UP
HBA1C BLD-MCNC: 5.8 % — HIGH (ref 4–5.6)
HCT VFR BLD CALC: 32.7 % — LOW (ref 34.5–45)
HCV AB S/CO SERPL IA: 11.21 S/CO — SIGNIFICANT CHANGE UP
HCV AB SERPL-IMP: REACTIVE
HDLC SERPL-MCNC: 35 MG/DL — LOW
HGB BLD-MCNC: 9.8 G/DL — LOW (ref 11.5–15.5)
IMM GRANULOCYTES NFR BLD AUTO: 0.3 % — SIGNIFICANT CHANGE UP (ref 0–1.5)
KETONES UR-MCNC: NEGATIVE — SIGNIFICANT CHANGE UP
LEUKOCYTE ESTERASE UR-ACNC: NEGATIVE — SIGNIFICANT CHANGE UP
LIPID PNL WITH DIRECT LDL SERPL: 70 MG/DL — SIGNIFICANT CHANGE UP
LYMPHOCYTES # BLD AUTO: 1.08 K/UL — SIGNIFICANT CHANGE UP (ref 1–3.3)
LYMPHOCYTES # BLD AUTO: 31.5 % — SIGNIFICANT CHANGE UP (ref 13–44)
MCHC RBC-ENTMCNC: 23.1 PG — LOW (ref 27–34)
MCHC RBC-ENTMCNC: 30 GM/DL — LOW (ref 32–36)
MCV RBC AUTO: 77.1 FL — LOW (ref 80–100)
MONOCYTES # BLD AUTO: 0.27 K/UL — SIGNIFICANT CHANGE UP (ref 0–0.9)
MONOCYTES NFR BLD AUTO: 7.9 % — SIGNIFICANT CHANGE UP (ref 2–14)
NEUTROPHILS # BLD AUTO: 1.88 K/UL — SIGNIFICANT CHANGE UP (ref 1.8–7.4)
NEUTROPHILS NFR BLD AUTO: 54.7 % — SIGNIFICANT CHANGE UP (ref 43–77)
NITRITE UR-MCNC: NEGATIVE — SIGNIFICANT CHANGE UP
NRBC # BLD: 0 /100 WBCS — SIGNIFICANT CHANGE UP (ref 0–0)
PH UR: 6.5 — SIGNIFICANT CHANGE UP (ref 5–8)
PLATELET # BLD AUTO: 197 K/UL — SIGNIFICANT CHANGE UP (ref 150–400)
POTASSIUM SERPL-MCNC: 4 MMOL/L — SIGNIFICANT CHANGE UP (ref 3.5–5.3)
POTASSIUM SERPL-SCNC: 4 MMOL/L — SIGNIFICANT CHANGE UP (ref 3.5–5.3)
PROT SERPL-MCNC: 8.1 G/DL — SIGNIFICANT CHANGE UP (ref 6–8.3)
PROT UR-MCNC: NEGATIVE MG/DL — SIGNIFICANT CHANGE UP
RBC # BLD: 4.24 M/UL — SIGNIFICANT CHANGE UP (ref 3.8–5.2)
RBC # FLD: 16 % — HIGH (ref 10.3–14.5)
SODIUM SERPL-SCNC: 140 MMOL/L — SIGNIFICANT CHANGE UP (ref 135–145)
SP GR SPEC: 1.01 — SIGNIFICANT CHANGE UP (ref 1–1.03)
TOTAL CHOLESTEROL/HDL RATIO MEASUREMENT: 3.9 RATIO — SIGNIFICANT CHANGE UP (ref 3.3–7.1)
TRIGL SERPL-MCNC: 149 MG/DL — SIGNIFICANT CHANGE UP (ref 10–149)
TSH SERPL-MCNC: 0.48 UIU/ML — SIGNIFICANT CHANGE UP (ref 0.35–4.94)
UROBILINOGEN FLD QL: 0.2 E.U./DL — SIGNIFICANT CHANGE UP
WBC # BLD: 3.43 K/UL — LOW (ref 3.8–10.5)
WBC # FLD AUTO: 3.43 K/UL — LOW (ref 3.8–10.5)

## 2019-08-15 PROCEDURE — 99214 OFFICE O/P EST MOD 30 MIN: CPT

## 2019-08-15 RX ORDER — AMOXICILLIN AND CLAVULANATE POTASSIUM 500; 125 MG/1; MG/1
500-125 TABLET, FILM COATED ORAL
Qty: 10 | Refills: 0 | Status: DISCONTINUED | COMMUNITY
Start: 2018-10-09 | End: 2019-08-15

## 2019-08-15 RX ORDER — METHYLPREDNISOLONE 4 MG/1
4 TABLET ORAL
Qty: 1 | Refills: 0 | Status: DISCONTINUED | COMMUNITY
Start: 2019-01-31 | End: 2019-08-15

## 2019-08-15 RX ORDER — B-COMPLEX WITH VITAMIN C
TABLET ORAL DAILY
Qty: 30 | Refills: 0 | Status: DISCONTINUED | COMMUNITY
Start: 2018-04-17 | End: 2019-08-15

## 2019-08-16 LAB
24R-OH-CALCIDIOL SERPL-MCNC: 12.8 NG/ML — LOW (ref 30–80)
4/8 RATIO: 0.21 RATIO — LOW (ref 0.9–3.6)
ABS CD8: 734 /UL — SIGNIFICANT CHANGE UP (ref 142–740)
C TRACH RRNA SPEC QL NAA+PROBE: SIGNIFICANT CHANGE UP
CD3 BLASTS SPEC-ACNC: 88 % — HIGH (ref 59–83)
CD3 BLASTS SPEC-ACNC: 915 /UL — SIGNIFICANT CHANGE UP (ref 672–1870)
CD4 %: 15 % — LOW (ref 30–62)
CD8 %: 70 % — HIGH (ref 12–36)
N GONORRHOEA RRNA SPEC QL NAA+PROBE: SIGNIFICANT CHANGE UP
SPECIMEN SOURCE: SIGNIFICANT CHANGE UP
T PALLIDUM AB TITR SER: NEGATIVE — SIGNIFICANT CHANGE UP
T-CELL CD4 SUBSET PNL BLD: 155 /UL — LOW (ref 489–1457)

## 2019-08-17 LAB
HIV-1 VIRAL LOAD RESULT: ABNORMAL
HIV1 RNA # SERPL NAA+PROBE: SIGNIFICANT CHANGE UP
HIV1 RNA SER-IMP: SIGNIFICANT CHANGE UP
HIV1 RNA SERPL NAA+PROBE-ACNC: ABNORMAL
HIV1 RNA SERPL NAA+PROBE-LOG#: ABNORMAL LG COP/ML

## 2019-08-20 LAB
GAMMA INTERFERON BACKGROUND BLD IA-ACNC: 0.03 IU/ML — SIGNIFICANT CHANGE UP
M TB IFN-G BLD-IMP: NEGATIVE — SIGNIFICANT CHANGE UP
M TB IFN-G CD4+ BCKGRND COR BLD-ACNC: 0.01 IU/ML — SIGNIFICANT CHANGE UP
M TB IFN-G CD4+CD8+ BCKGRND COR BLD-ACNC: 0.01 IU/ML — SIGNIFICANT CHANGE UP
QUANT TB PLUS MITOGEN MINUS NIL: 1.29 IU/ML — SIGNIFICANT CHANGE UP

## 2019-08-21 DIAGNOSIS — K21.9 GASTRO-ESOPHAGEAL REFLUX DISEASE WITHOUT ESOPHAGITIS: ICD-10-CM

## 2019-08-21 DIAGNOSIS — Z00.00 ENCOUNTER FOR GENERAL ADULT MEDICAL EXAMINATION WITHOUT ABNORMAL FINDINGS: ICD-10-CM

## 2019-08-21 DIAGNOSIS — B19.20 UNSPECIFIED VIRAL HEPATITIS C WITHOUT HEPATIC COMA: ICD-10-CM

## 2019-08-21 DIAGNOSIS — R56.9 UNSPECIFIED CONVULSIONS: ICD-10-CM

## 2019-08-21 DIAGNOSIS — R03.0 ELEVATED BLOOD-PRESSURE READING, WITHOUT DIAGNOSIS OF HYPERTENSION: ICD-10-CM

## 2019-08-21 DIAGNOSIS — Z87.09 PERSONAL HISTORY OF OTHER DISEASES OF THE RESPIRATORY SYSTEM: ICD-10-CM

## 2019-08-21 DIAGNOSIS — F14.20 COCAINE DEPENDENCE, UNCOMPLICATED: ICD-10-CM

## 2019-08-21 DIAGNOSIS — B20 HUMAN IMMUNODEFICIENCY VIRUS [HIV] DISEASE: ICD-10-CM

## 2019-08-21 DIAGNOSIS — R59.0 LOCALIZED ENLARGED LYMPH NODES: ICD-10-CM

## 2019-08-21 DIAGNOSIS — J44.9 CHRONIC OBSTRUCTIVE PULMONARY DISEASE, UNSPECIFIED: ICD-10-CM

## 2019-09-25 ENCOUNTER — APPOINTMENT (OUTPATIENT)
Dept: INFECTIOUS DISEASE | Facility: CLINIC | Age: 59
End: 2019-09-25

## 2019-09-25 ENCOUNTER — OUTPATIENT (OUTPATIENT)
Dept: OUTPATIENT SERVICES | Facility: HOSPITAL | Age: 59
LOS: 1 days | End: 2019-09-25

## 2019-09-25 DIAGNOSIS — B02.9 ZOSTER W/OUT COMPLICATIONS: ICD-10-CM

## 2019-10-01 DIAGNOSIS — B20 HUMAN IMMUNODEFICIENCY VIRUS [HIV] DISEASE: ICD-10-CM

## 2019-10-01 DIAGNOSIS — B02.9 ZOSTER WITHOUT COMPLICATIONS: ICD-10-CM

## 2019-11-14 ENCOUNTER — LABORATORY RESULT (OUTPATIENT)
Age: 59
End: 2019-11-14

## 2019-12-10 ENCOUNTER — APPOINTMENT (OUTPATIENT)
Dept: INFECTIOUS DISEASE | Facility: CLINIC | Age: 59
End: 2019-12-10

## 2020-01-01 ENCOUNTER — APPOINTMENT (OUTPATIENT)
Dept: RADIOLOGY | Facility: CLINIC | Age: 60
End: 2020-01-01
Payer: MEDICARE

## 2020-01-01 ENCOUNTER — NON-APPOINTMENT (OUTPATIENT)
Age: 60
End: 2020-01-01

## 2020-01-01 ENCOUNTER — OUTPATIENT (OUTPATIENT)
Dept: OUTPATIENT SERVICES | Facility: HOSPITAL | Age: 60
LOS: 1 days | End: 2020-01-01

## 2020-01-01 ENCOUNTER — APPOINTMENT (OUTPATIENT)
Dept: INFECTIOUS DISEASE | Facility: CLINIC | Age: 60
End: 2020-01-01
Payer: MEDICARE

## 2020-01-01 ENCOUNTER — APPOINTMENT (OUTPATIENT)
Dept: MRI IMAGING | Facility: CLINIC | Age: 60
End: 2020-01-01

## 2020-01-01 ENCOUNTER — MED ADMIN CHARGE (OUTPATIENT)
Age: 60
End: 2020-01-01

## 2020-01-01 ENCOUNTER — APPOINTMENT (OUTPATIENT)
Dept: ULTRASOUND IMAGING | Facility: CLINIC | Age: 60
End: 2020-01-01

## 2020-01-01 ENCOUNTER — APPOINTMENT (OUTPATIENT)
Dept: PULMONOLOGY | Facility: CLINIC | Age: 60
End: 2020-01-01

## 2020-01-01 ENCOUNTER — APPOINTMENT (OUTPATIENT)
Dept: ULTRASOUND IMAGING | Facility: CLINIC | Age: 60
End: 2020-01-01
Payer: MEDICARE

## 2020-01-01 ENCOUNTER — APPOINTMENT (OUTPATIENT)
Dept: MAMMOGRAPHY | Facility: CLINIC | Age: 60
End: 2020-01-01

## 2020-01-01 ENCOUNTER — APPOINTMENT (OUTPATIENT)
Dept: INFECTIOUS DISEASE | Facility: CLINIC | Age: 60
End: 2020-01-01

## 2020-01-01 ENCOUNTER — APPOINTMENT (OUTPATIENT)
Dept: OBGYN | Facility: CLINIC | Age: 60
End: 2020-01-01

## 2020-01-01 ENCOUNTER — APPOINTMENT (OUTPATIENT)
Dept: RADIOLOGY | Facility: CLINIC | Age: 60
End: 2020-01-01

## 2020-01-01 ENCOUNTER — APPOINTMENT (OUTPATIENT)
Dept: MRI IMAGING | Facility: CLINIC | Age: 60
End: 2020-01-01
Payer: MEDICARE

## 2020-01-01 VITALS
HEIGHT: 64 IN | RESPIRATION RATE: 14 BRPM | TEMPERATURE: 98.5 F | SYSTOLIC BLOOD PRESSURE: 125 MMHG | WEIGHT: 99 LBS | BODY MASS INDEX: 16.9 KG/M2 | OXYGEN SATURATION: 93 % | DIASTOLIC BLOOD PRESSURE: 78 MMHG | HEART RATE: 88 BPM

## 2020-01-01 DIAGNOSIS — R93.5 ABNORMAL FINDINGS ON DIAGNOSTIC IMAGING OF OTHER ABDOMINAL REGIONS, INCLUDING RETROPERITONEUM: ICD-10-CM

## 2020-01-01 DIAGNOSIS — Z23 ENCOUNTER FOR IMMUNIZATION: ICD-10-CM

## 2020-01-01 DIAGNOSIS — Z00.00 ENCOUNTER FOR GENERAL ADULT MEDICAL EXAMINATION WITHOUT ABNORMAL FINDINGS: ICD-10-CM

## 2020-01-01 DIAGNOSIS — Z00.00 ENCOUNTER FOR GENERAL ADULT MEDICAL EXAMINATION W/OUT ABNORMAL FINDINGS: ICD-10-CM

## 2020-01-01 LAB
24R-OH-CALCIDIOL SERPL-MCNC: 10.8 NG/ML — LOW (ref 30–80)
4/8 RATIO: 0.22 RATIO — LOW (ref 0.9–3.6)
A1C WITH ESTIMATED AVERAGE GLUCOSE RESULT: 5.6 % — SIGNIFICANT CHANGE UP (ref 4–5.6)
ABS CD8: 323 /UL — SIGNIFICANT CHANGE UP (ref 142–740)
ALBUMIN SERPL ELPH-MCNC: 2.9 G/DL — LOW (ref 3.3–5)
ALBUMIN SERPL ELPH-MCNC: 3.3 G/DL — SIGNIFICANT CHANGE UP (ref 3.3–5)
ALP SERPL-CCNC: 193 U/L — HIGH (ref 40–120)
ALP SERPL-CCNC: 232 U/L — HIGH (ref 40–120)
ALT FLD-CCNC: 23 U/L — SIGNIFICANT CHANGE UP (ref 10–45)
ALT FLD-CCNC: 9 U/L — LOW (ref 10–45)
ANION GAP SERPL CALC-SCNC: 10 MMOL/L — SIGNIFICANT CHANGE UP (ref 5–17)
ANION GAP SERPL CALC-SCNC: 9 MMOL/L — SIGNIFICANT CHANGE UP (ref 5–17)
APPEARANCE UR: CLEAR — SIGNIFICANT CHANGE UP
AST SERPL-CCNC: 21 U/L — SIGNIFICANT CHANGE UP (ref 10–40)
AST SERPL-CCNC: 37 U/L — SIGNIFICANT CHANGE UP (ref 10–40)
BASOPHILS # BLD AUTO: 0 K/UL — SIGNIFICANT CHANGE UP (ref 0–0.2)
BASOPHILS NFR BLD AUTO: 0 % — SIGNIFICANT CHANGE UP (ref 0–2)
BILIRUB SERPL-MCNC: 0.2 MG/DL — SIGNIFICANT CHANGE UP (ref 0.2–1.2)
BILIRUB SERPL-MCNC: 0.4 MG/DL — SIGNIFICANT CHANGE UP (ref 0.2–1.2)
BILIRUB UR-MCNC: NEGATIVE — SIGNIFICANT CHANGE UP
BUN SERPL-MCNC: 17 MG/DL — SIGNIFICANT CHANGE UP (ref 7–23)
BUN SERPL-MCNC: 18 MG/DL — SIGNIFICANT CHANGE UP (ref 7–23)
C TRACH RRNA SPEC QL NAA+PROBE: SIGNIFICANT CHANGE UP
CALCIUM SERPL-MCNC: 8 MG/DL — LOW (ref 8.4–10.5)
CALCIUM SERPL-MCNC: 8.9 MG/DL — SIGNIFICANT CHANGE UP (ref 8.4–10.5)
CCP IGG SERPL-ACNC: <8 UNITS — SIGNIFICANT CHANGE UP
CD3 BLASTS SPEC-ACNC: 416 /UL — LOW (ref 672–1870)
CD3 BLASTS SPEC-ACNC: 88 % — HIGH (ref 59–83)
CD4 %: 15 % — LOW (ref 30–62)
CD8 %: 68 % — HIGH (ref 12–36)
CHLORIDE SERPL-SCNC: 101 MMOL/L — SIGNIFICANT CHANGE UP (ref 96–108)
CHLORIDE SERPL-SCNC: 101 MMOL/L — SIGNIFICANT CHANGE UP (ref 96–108)
CHOLEST SERPL-MCNC: 130 MG/DL — SIGNIFICANT CHANGE UP (ref 10–199)
CO2 SERPL-SCNC: 24 MMOL/L — SIGNIFICANT CHANGE UP (ref 22–31)
CO2 SERPL-SCNC: 26 MMOL/L — SIGNIFICANT CHANGE UP (ref 22–31)
COLOR SPEC: YELLOW — SIGNIFICANT CHANGE UP
CREAT SERPL-MCNC: 1.03 MG/DL — SIGNIFICANT CHANGE UP (ref 0.5–1.3)
CREAT SERPL-MCNC: 1.14 MG/DL — SIGNIFICANT CHANGE UP (ref 0.5–1.3)
CRP SERPL-MCNC: 1.55 MG/DL — HIGH (ref 0–0.4)
DIFF PNL FLD: NEGATIVE — SIGNIFICANT CHANGE UP
EOSINOPHIL # BLD AUTO: 0.21 K/UL — SIGNIFICANT CHANGE UP (ref 0–0.5)
EOSINOPHIL NFR BLD AUTO: 9.4 % — HIGH (ref 0–6)
ERYTHROCYTE [SEDIMENTATION RATE] IN BLOOD: 40 MM/HR — HIGH
ESTIMATED AVERAGE GLUCOSE: 114 MG/DL — SIGNIFICANT CHANGE UP (ref 68–114)
GAMMA INTERFERON BACKGROUND BLD IA-ACNC: 0.81 IU/ML — SIGNIFICANT CHANGE UP
GLUCOSE SERPL-MCNC: 118 MG/DL — HIGH (ref 70–99)
GLUCOSE SERPL-MCNC: 98 MG/DL — SIGNIFICANT CHANGE UP (ref 70–99)
GLUCOSE UR QL: NEGATIVE — SIGNIFICANT CHANGE UP
HAV IGG SER QL IA: REACTIVE
HBV CORE AB SER-ACNC: SIGNIFICANT CHANGE UP
HBV SURFACE AB SER-ACNC: SIGNIFICANT CHANGE UP
HBV SURFACE AG SER-ACNC: SIGNIFICANT CHANGE UP
HCT VFR BLD CALC: 29.4 % — LOW (ref 34.5–45)
HCV AB S/CO SERPL IA: 11.95 S/CO — SIGNIFICANT CHANGE UP
HCV AB SERPL-IMP: REACTIVE
HCV GENTYP BLD NAA+PROBE: SIGNIFICANT CHANGE UP
HCV RNA SERPL NAA DL=5-ACNC: HIGH IU/ML
HCV RNA SERPL NAA DL=5-ACNC: HIGH IU/ML
HCV RNA SPEC NAA+PROBE-LOG IU: 6.35 LOG10IU/ML — HIGH
HCV RNA SPEC NAA+PROBE-LOG IU: 6.86 LOG10IU/ML — HIGH
HDLC SERPL-MCNC: 24 MG/DL — LOW
HGB BLD-MCNC: 8.2 G/DL — LOW (ref 11.5–15.5)
HIV-1 VIRAL LOAD RESULT: ABNORMAL
HIV1 RNA # SERPL NAA+PROBE: SIGNIFICANT CHANGE UP
HIV1 RNA SER-IMP: SIGNIFICANT CHANGE UP
HIV1 RNA SERPL NAA+PROBE-ACNC: ABNORMAL
HIV1 RNA SERPL NAA+PROBE-LOG#: ABNORMAL LG COP/ML
KETONES UR-MCNC: NEGATIVE — SIGNIFICANT CHANGE UP
LEUKOCYTE ESTERASE UR-ACNC: NEGATIVE — SIGNIFICANT CHANGE UP
LIPID PNL WITH DIRECT LDL SERPL: 73 MG/DL — SIGNIFICANT CHANGE UP
LYMPHOCYTES # BLD AUTO: 0.29 K/UL — LOW (ref 1–3.3)
LYMPHOCYTES # BLD AUTO: 13.2 % — SIGNIFICANT CHANGE UP (ref 13–44)
M TB IFN-G BLD-IMP: NEGATIVE — SIGNIFICANT CHANGE UP
M TB IFN-G CD4+ BCKGRND COR BLD-ACNC: 0.02 IU/ML — SIGNIFICANT CHANGE UP
M TB IFN-G CD4+CD8+ BCKGRND COR BLD-ACNC: 0.03 IU/ML — SIGNIFICANT CHANGE UP
MCHC RBC-ENTMCNC: 19.7 PG — LOW (ref 27–34)
MCHC RBC-ENTMCNC: 27.9 GM/DL — LOW (ref 32–36)
MCV RBC AUTO: 70.7 FL — LOW (ref 80–100)
MONOCYTES # BLD AUTO: 0.08 K/UL — SIGNIFICANT CHANGE UP (ref 0–0.9)
MONOCYTES NFR BLD AUTO: 3.8 % — SIGNIFICANT CHANGE UP (ref 2–14)
N GONORRHOEA RRNA SPEC QL NAA+PROBE: SIGNIFICANT CHANGE UP
NEUTROPHILS # BLD AUTO: 1.63 K/UL — LOW (ref 1.8–7.4)
NEUTROPHILS NFR BLD AUTO: 64.2 % — SIGNIFICANT CHANGE UP (ref 43–77)
NITRITE UR-MCNC: NEGATIVE — SIGNIFICANT CHANGE UP
PH UR: 6.5 — SIGNIFICANT CHANGE UP (ref 5–8)
PLATELET # BLD AUTO: 187 K/UL — SIGNIFICANT CHANGE UP (ref 150–400)
POTASSIUM SERPL-MCNC: 4 MMOL/L — SIGNIFICANT CHANGE UP (ref 3.5–5.3)
POTASSIUM SERPL-MCNC: 4.8 MMOL/L — SIGNIFICANT CHANGE UP (ref 3.5–5.3)
POTASSIUM SERPL-SCNC: 4 MMOL/L — SIGNIFICANT CHANGE UP (ref 3.5–5.3)
POTASSIUM SERPL-SCNC: 4.8 MMOL/L — SIGNIFICANT CHANGE UP (ref 3.5–5.3)
PROT SERPL-MCNC: 8.4 G/DL — HIGH (ref 6–8.3)
PROT SERPL-MCNC: 8.6 G/DL — HIGH (ref 6–8.3)
PROT UR-MCNC: NEGATIVE MG/DL — SIGNIFICANT CHANGE UP
QUANT TB PLUS MITOGEN MINUS NIL: 0.63 IU/ML — SIGNIFICANT CHANGE UP
RBC # BLD: 4.16 M/UL — SIGNIFICANT CHANGE UP (ref 3.8–5.2)
RBC # FLD: 16.5 % — HIGH (ref 10.3–14.5)
RF+CCP IGG SER-IMP: NEGATIVE — SIGNIFICANT CHANGE UP
RHEUMATOID FACT SERPL-ACNC: <10 IU/ML — SIGNIFICANT CHANGE UP (ref 0–13)
SODIUM SERPL-SCNC: 134 MMOL/L — LOW (ref 135–145)
SODIUM SERPL-SCNC: 137 MMOL/L — SIGNIFICANT CHANGE UP (ref 135–145)
SP GR SPEC: 1.02 — SIGNIFICANT CHANGE UP (ref 1–1.03)
SPECIMEN SOURCE: SIGNIFICANT CHANGE UP
T PALLIDUM AB TITR SER: NEGATIVE — SIGNIFICANT CHANGE UP
T-CELL CD4 SUBSET PNL BLD: 70 /UL — LOW (ref 489–1457)
TOTAL CHOLESTEROL/HDL RATIO MEASUREMENT: 5.4 RATIO — SIGNIFICANT CHANGE UP (ref 3.3–7.1)
TRIGL SERPL-MCNC: 163 MG/DL — HIGH (ref 10–149)
UROBILINOGEN FLD QL: 0.2 E.U./DL — SIGNIFICANT CHANGE UP
WBC # BLD: 2.21 K/UL — LOW (ref 3.8–10.5)
WBC # FLD AUTO: 2.21 K/UL — LOW (ref 3.8–10.5)

## 2020-01-01 PROCEDURE — 76700 US EXAM ABDOM COMPLETE: CPT | Mod: 26

## 2020-01-01 PROCEDURE — 74183 MRI ABD W/O CNTR FLWD CNTR: CPT | Mod: 26

## 2020-01-01 PROCEDURE — 99214 OFFICE O/P EST MOD 30 MIN: CPT

## 2020-01-01 RX ORDER — BUDESONIDE AND FORMOTEROL FUMARATE DIHYDRATE 80; 4.5 UG/1; UG/1
80-4.5 AEROSOL RESPIRATORY (INHALATION) TWICE DAILY
Qty: 1 | Refills: 5 | Status: DISCONTINUED | COMMUNITY
Start: 2020-01-01 | End: 2020-01-01

## 2020-01-01 RX ORDER — DICLOFENAC SODIUM 3 G/100G
3 GEL TOPICAL TWICE DAILY
Qty: 1 | Refills: 0 | Status: ACTIVE | COMMUNITY
Start: 2020-01-01 | End: 1900-01-01

## 2020-01-01 RX ORDER — OMEPRAZOLE 20 MG/1
20 TABLET, DELAYED RELEASE ORAL
Qty: 30 | Refills: 1 | Status: DISCONTINUED | COMMUNITY
Start: 2018-02-22 | End: 2020-01-01

## 2020-01-01 RX ORDER — AMITRIPTYLINE HYDROCHLORIDE 10 MG/1
10 TABLET, FILM COATED ORAL
Qty: 30 | Refills: 0 | Status: DISCONTINUED | COMMUNITY
Start: 2019-09-25 | End: 2020-01-01

## 2020-01-01 RX ORDER — FLUCONAZOLE 100 MG/1
100 TABLET ORAL DAILY
Qty: 14 | Refills: 0 | Status: DISCONTINUED | COMMUNITY
Start: 2020-05-13 | End: 2020-01-01

## 2020-01-01 RX ORDER — LIDOCAINE 5% 5 G/100G
5 CREAM TOPICAL EVERY 6 HOURS
Qty: 1 | Refills: 0 | Status: DISCONTINUED | COMMUNITY
Start: 2019-09-25 | End: 2020-01-01

## 2020-01-01 RX ORDER — VALACYCLOVIR HYDROCHLORIDE 1 G/1
1 TABLET, FILM COATED ORAL
Refills: 0 | Status: DISCONTINUED | COMMUNITY
Start: 2018-02-22 | End: 2020-01-01

## 2020-02-18 ENCOUNTER — APPOINTMENT (OUTPATIENT)
Dept: INFECTIOUS DISEASE | Facility: CLINIC | Age: 60
End: 2020-02-18

## 2020-02-21 ENCOUNTER — APPOINTMENT (OUTPATIENT)
Dept: INFECTIOUS DISEASE | Facility: CLINIC | Age: 60
End: 2020-02-21

## 2020-02-24 ENCOUNTER — APPOINTMENT (OUTPATIENT)
Dept: INFECTIOUS DISEASE | Facility: CLINIC | Age: 60
End: 2020-02-24

## 2020-05-13 ENCOUNTER — APPOINTMENT (OUTPATIENT)
Dept: INFECTIOUS DISEASE | Facility: CLINIC | Age: 60
End: 2020-05-13

## 2020-05-13 ENCOUNTER — OUTPATIENT (OUTPATIENT)
Dept: OUTPATIENT SERVICES | Facility: HOSPITAL | Age: 60
LOS: 1 days | End: 2020-05-13

## 2020-05-21 DIAGNOSIS — B20 HUMAN IMMUNODEFICIENCY VIRUS [HIV] DISEASE: ICD-10-CM

## 2020-09-22 PROBLEM — Z00.00 HEALTHCARE MAINTENANCE: Status: ACTIVE | Noted: 2017-07-10

## 2020-09-22 PROBLEM — Z23 NEED FOR VACCINATION: Status: ACTIVE | Noted: 2020-01-01

## 2020-10-29 PROBLEM — R93.5 ABNORMAL ULTRASOUND OF ABDOMEN: Status: ACTIVE | Noted: 2020-01-01

## 2020-11-30 NOTE — PROGRESS NOTE ADULT - PROBLEM/PLAN-3
How Severe Is Your Psoriasis?: severe Do You Have A Family History Of Psoriasis?: no Is This A New Presentation, Or A Follow-Up?: Psoriasis Additional History: Psoriasis is maintained right now and under control DISPLAY PLAN FREE TEXT

## 2021-01-01 ENCOUNTER — RESULT REVIEW (OUTPATIENT)
Age: 61
End: 2021-01-01

## 2021-01-01 ENCOUNTER — APPOINTMENT (OUTPATIENT)
Dept: INFECTIOUS DISEASE | Facility: CLINIC | Age: 61
End: 2021-01-01

## 2021-01-01 ENCOUNTER — OUTPATIENT (OUTPATIENT)
Dept: OUTPATIENT SERVICES | Facility: HOSPITAL | Age: 61
LOS: 1 days | End: 2021-01-01

## 2021-01-01 ENCOUNTER — APPOINTMENT (OUTPATIENT)
Dept: DERMATOLOGY | Facility: CLINIC | Age: 61
End: 2021-01-01
Payer: MEDICARE

## 2021-01-01 ENCOUNTER — OUTPATIENT (OUTPATIENT)
Dept: OUTPATIENT SERVICES | Facility: HOSPITAL | Age: 61
LOS: 1 days | End: 2021-01-01
Payer: MEDICARE

## 2021-01-01 ENCOUNTER — NON-APPOINTMENT (OUTPATIENT)
Age: 61
End: 2021-01-01

## 2021-01-01 ENCOUNTER — APPOINTMENT (OUTPATIENT)
Dept: INFECTIOUS DISEASE | Facility: CLINIC | Age: 61
End: 2021-01-01
Payer: MEDICARE

## 2021-01-01 ENCOUNTER — APPOINTMENT (OUTPATIENT)
Dept: PULMONOLOGY | Facility: CLINIC | Age: 61
End: 2021-01-01
Payer: MEDICARE

## 2021-01-01 ENCOUNTER — TRANSCRIPTION ENCOUNTER (OUTPATIENT)
Age: 61
End: 2021-01-01

## 2021-01-01 ENCOUNTER — APPOINTMENT (OUTPATIENT)
Dept: CT IMAGING | Facility: CLINIC | Age: 61
End: 2021-01-01
Payer: MEDICARE

## 2021-01-01 ENCOUNTER — APPOINTMENT (OUTPATIENT)
Dept: GASTROENTEROLOGY | Facility: CLINIC | Age: 61
End: 2021-01-01
Payer: MEDICARE

## 2021-01-01 ENCOUNTER — LABORATORY RESULT (OUTPATIENT)
Age: 61
End: 2021-01-01

## 2021-01-01 ENCOUNTER — APPOINTMENT (OUTPATIENT)
Dept: GASTROENTEROLOGY | Facility: HOSPITAL | Age: 61
End: 2021-01-01

## 2021-01-01 ENCOUNTER — APPOINTMENT (OUTPATIENT)
Dept: GASTROENTEROLOGY | Facility: CLINIC | Age: 61
End: 2021-01-01

## 2021-01-01 ENCOUNTER — OUTPATIENT (OUTPATIENT)
Dept: OUTPATIENT SERVICES | Facility: HOSPITAL | Age: 61
LOS: 1 days | Discharge: HOME | End: 2021-01-01
Payer: MEDICARE

## 2021-01-01 VITALS
DIASTOLIC BLOOD PRESSURE: 89 MMHG | HEIGHT: 64 IN | WEIGHT: 99 LBS | BODY MASS INDEX: 16.9 KG/M2 | SYSTOLIC BLOOD PRESSURE: 183 MMHG | HEART RATE: 102 BPM | OXYGEN SATURATION: 75 % | TEMPERATURE: 97.6 F

## 2021-01-01 VITALS
OXYGEN SATURATION: 94 % | BODY MASS INDEX: 15.71 KG/M2 | HEIGHT: 64 IN | WEIGHT: 92 LBS | DIASTOLIC BLOOD PRESSURE: 82 MMHG | HEART RATE: 95 BPM | TEMPERATURE: 97.8 F | SYSTOLIC BLOOD PRESSURE: 148 MMHG

## 2021-01-01 VITALS
TEMPERATURE: 98 F | WEIGHT: 89 LBS | OXYGEN SATURATION: 94 % | DIASTOLIC BLOOD PRESSURE: 80 MMHG | SYSTOLIC BLOOD PRESSURE: 120 MMHG | HEIGHT: 60 IN | RESPIRATION RATE: 14 BRPM | HEART RATE: 80 BPM | BODY MASS INDEX: 17.47 KG/M2

## 2021-01-01 VITALS
SYSTOLIC BLOOD PRESSURE: 149 MMHG | TEMPERATURE: 98.7 F | OXYGEN SATURATION: 100 % | HEIGHT: 64 IN | BODY MASS INDEX: 17.07 KG/M2 | RESPIRATION RATE: 12 BRPM | HEART RATE: 87 BPM | DIASTOLIC BLOOD PRESSURE: 85 MMHG | WEIGHT: 100 LBS

## 2021-01-01 VITALS
SYSTOLIC BLOOD PRESSURE: 127 MMHG | RESPIRATION RATE: 14 BRPM | DIASTOLIC BLOOD PRESSURE: 84 MMHG | TEMPERATURE: 98.6 F | OXYGEN SATURATION: 94 % | WEIGHT: 92.6 LBS | HEIGHT: 64 IN | BODY MASS INDEX: 15.81 KG/M2 | HEART RATE: 84 BPM

## 2021-01-01 VITALS
TEMPERATURE: 97.6 F | BODY MASS INDEX: 15.54 KG/M2 | RESPIRATION RATE: 14 BRPM | WEIGHT: 91 LBS | DIASTOLIC BLOOD PRESSURE: 72 MMHG | OXYGEN SATURATION: 91 % | HEIGHT: 64 IN | HEART RATE: 80 BPM | SYSTOLIC BLOOD PRESSURE: 130 MMHG

## 2021-01-01 VITALS
HEIGHT: 60 IN | BODY MASS INDEX: 17 KG/M2 | TEMPERATURE: 98 F | HEART RATE: 80 BPM | SYSTOLIC BLOOD PRESSURE: 110 MMHG | DIASTOLIC BLOOD PRESSURE: 65 MMHG | WEIGHT: 86.6 LBS

## 2021-01-01 VITALS
RESPIRATION RATE: 14 BRPM | DIASTOLIC BLOOD PRESSURE: 78 MMHG | WEIGHT: 91 LBS | BODY MASS INDEX: 15.54 KG/M2 | TEMPERATURE: 98 F | OXYGEN SATURATION: 91 % | HEIGHT: 64 IN | SYSTOLIC BLOOD PRESSURE: 118 MMHG | HEART RATE: 80 BPM

## 2021-01-01 VITALS
RESPIRATION RATE: 92 BRPM | DIASTOLIC BLOOD PRESSURE: 90 MMHG | HEIGHT: 64 IN | WEIGHT: 92 LBS | HEART RATE: 83 BPM | SYSTOLIC BLOOD PRESSURE: 138 MMHG | OXYGEN SATURATION: 93 % | BODY MASS INDEX: 15.71 KG/M2 | TEMPERATURE: 98.4 F

## 2021-01-01 VITALS — OXYGEN SATURATION: 92 %

## 2021-01-01 DIAGNOSIS — R13.10 DYSPHAGIA, UNSPECIFIED: ICD-10-CM

## 2021-01-01 DIAGNOSIS — M25.542 PAIN IN JOINTS OF RIGHT HAND: ICD-10-CM

## 2021-01-01 DIAGNOSIS — D48.9 NEOPLASM OF UNCERTAIN BEHAVIOR, UNSPECIFIED: ICD-10-CM

## 2021-01-01 DIAGNOSIS — D69.2 OTHER NONTHROMBOCYTOPENIC PURPURA: ICD-10-CM

## 2021-01-01 DIAGNOSIS — M25.541 PAIN IN JOINTS OF RIGHT HAND: ICD-10-CM

## 2021-01-01 DIAGNOSIS — K74.60 UNSPECIFIED CIRRHOSIS OF LIVER: ICD-10-CM

## 2021-01-01 DIAGNOSIS — B20 HUMAN IMMUNODEFICIENCY VIRUS [HIV] DISEASE: ICD-10-CM

## 2021-01-01 DIAGNOSIS — D50.0 IRON DEFICIENCY ANEMIA SECONDARY TO BLOOD LOSS (CHRONIC): ICD-10-CM

## 2021-01-01 DIAGNOSIS — I77.6 ARTERITIS, UNSPECIFIED: ICD-10-CM

## 2021-01-01 DIAGNOSIS — B37.81 CANDIDAL ESOPHAGITIS: ICD-10-CM

## 2021-01-01 DIAGNOSIS — F14.21 COCAINE DEPENDENCE, IN REMISSION: ICD-10-CM

## 2021-01-01 DIAGNOSIS — J44.9 CHRONIC OBSTRUCTIVE PULMONARY DISEASE, UNSPECIFIED: ICD-10-CM

## 2021-01-01 DIAGNOSIS — E55.9 VITAMIN D DEFICIENCY, UNSPECIFIED: ICD-10-CM

## 2021-01-01 DIAGNOSIS — J96.11 CHRONIC RESPIRATORY FAILURE WITH HYPOXIA: ICD-10-CM

## 2021-01-01 DIAGNOSIS — F14.20 COCAINE DEPENDENCE, UNCOMPLICATED: ICD-10-CM

## 2021-01-01 DIAGNOSIS — B19.20 UNSPECIFIED VIRAL HEPATITIS C WITHOUT HEPATIC COMA: ICD-10-CM

## 2021-01-01 DIAGNOSIS — N17.9 ACUTE KIDNEY FAILURE, UNSPECIFIED: ICD-10-CM

## 2021-01-01 DIAGNOSIS — R21 RASH AND OTHER NONSPECIFIC SKIN ERUPTION: ICD-10-CM

## 2021-01-01 DIAGNOSIS — D50.9 IRON DEFICIENCY ANEMIA, UNSPECIFIED: ICD-10-CM

## 2021-01-01 DIAGNOSIS — R06.02 SHORTNESS OF BREATH: ICD-10-CM

## 2021-01-01 DIAGNOSIS — R16.2 HEPATOMEGALY WITH SPLENOMEGALY, NOT ELSEWHERE CLASSIFIED: ICD-10-CM

## 2021-01-01 DIAGNOSIS — K63.4 ENTEROPTOSIS: ICD-10-CM

## 2021-01-01 DIAGNOSIS — R56.9 UNSPECIFIED CONVULSIONS: ICD-10-CM

## 2021-01-01 DIAGNOSIS — R91.8 OTHER NONSPECIFIC ABNORMAL FINDING OF LUNG FIELD: ICD-10-CM

## 2021-01-01 DIAGNOSIS — Z91.89 OTHER SPECIFIED PERSONAL RISK FACTORS, NOT ELSEWHERE CLASSIFIED: ICD-10-CM

## 2021-01-01 DIAGNOSIS — I87.8 OTHER SPECIFIED DISORDERS OF VEINS: ICD-10-CM

## 2021-01-01 DIAGNOSIS — B35.3 TINEA PEDIS: ICD-10-CM

## 2021-01-01 DIAGNOSIS — R63.6 UNDERWEIGHT: ICD-10-CM

## 2021-01-01 DIAGNOSIS — F19.10 OTHER PSYCHOACTIVE SUBSTANCE ABUSE, UNCOMPLICATED: ICD-10-CM

## 2021-01-01 DIAGNOSIS — K62.9 DISEASE OF ANUS AND RECTUM, UNSPECIFIED: ICD-10-CM

## 2021-01-01 DIAGNOSIS — K21.9 GASTRO-ESOPHAGEAL REFLUX DISEASE WITHOUT ESOPHAGITIS: ICD-10-CM

## 2021-01-01 DIAGNOSIS — Z72.0 TOBACCO USE: ICD-10-CM

## 2021-01-01 DIAGNOSIS — B00.9 HERPESVIRAL INFECTION, UNSPECIFIED: ICD-10-CM

## 2021-01-01 DIAGNOSIS — R05 COUGH: ICD-10-CM

## 2021-01-01 DIAGNOSIS — B27.81 OTHER INFECTIOUS MONONUCLEOSIS WITH POLYNEUROPATHY: ICD-10-CM

## 2021-01-01 DIAGNOSIS — Z11.59 ENCOUNTER FOR SCREENING FOR OTHER VIRAL DISEASES: ICD-10-CM

## 2021-01-01 DIAGNOSIS — J45.909 UNSPECIFIED ASTHMA, UNCOMPLICATED: ICD-10-CM

## 2021-01-01 DIAGNOSIS — K21.9 GASTRO-ESOPHAGEAL REFLUX DISEASE W/OUT ESOPHAGITIS: ICD-10-CM

## 2021-01-01 DIAGNOSIS — Z23 ENCOUNTER FOR IMMUNIZATION: ICD-10-CM

## 2021-01-01 DIAGNOSIS — R63.4 ABNORMAL WEIGHT LOSS: ICD-10-CM

## 2021-01-01 DIAGNOSIS — B19.20 UNSPECIFIED VIRAL HEPATITIS C W/OUT HEPATIC COMA: ICD-10-CM

## 2021-01-01 DIAGNOSIS — E50.0 VITAMIN A DEFICIENCY WITH CONJUNCTIVAL XEROSIS: ICD-10-CM

## 2021-01-01 DIAGNOSIS — M25.542 PAIN IN JOINTS OF LEFT HAND: ICD-10-CM

## 2021-01-01 DIAGNOSIS — L85.3 XEROSIS CUTIS: ICD-10-CM

## 2021-01-01 LAB
24R-OH-CALCIDIOL SERPL-MCNC: 16.2 NG/ML — LOW (ref 30–80)
24R-OH-CALCIDIOL SERPL-MCNC: 20 NG/ML — LOW (ref 30–80)
4/8 RATIO: 0.16 RATIO — LOW (ref 0.9–3.6)
4/8 RATIO: 0.16 RATIO — LOW (ref 0.9–3.6)
4/8 RATIO: 0.22 RATIO — LOW (ref 0.9–3.6)
A1C WITH ESTIMATED AVERAGE GLUCOSE RESULT: 5.6 % — SIGNIFICANT CHANGE UP (ref 4–5.6)
ABS CD8: 190 /UL — SIGNIFICANT CHANGE UP (ref 142–740)
ABS CD8: 332 /UL — SIGNIFICANT CHANGE UP (ref 142–740)
ABS CD8: 372 /UL — SIGNIFICANT CHANGE UP (ref 142–740)
ALBUMIN SERPL ELPH-MCNC: 2.6 G/DL — LOW (ref 3.3–5)
ALBUMIN SERPL ELPH-MCNC: 2.7 G/DL — LOW (ref 3.3–5)
ALBUMIN SERPL ELPH-MCNC: 2.8 G/DL — LOW (ref 3.3–5)
ALBUMIN SERPL ELPH-MCNC: 2.9 G/DL — LOW (ref 3.3–5)
ALP SERPL-CCNC: 181 U/L — HIGH (ref 40–120)
ALP SERPL-CCNC: 187 U/L — HIGH (ref 40–120)
ALP SERPL-CCNC: 203 U/L — HIGH (ref 40–120)
ALP SERPL-CCNC: 219 U/L — HIGH (ref 40–120)
ALT FLD-CCNC: 13 U/L — SIGNIFICANT CHANGE UP (ref 10–45)
ALT FLD-CCNC: 28 U/L — SIGNIFICANT CHANGE UP (ref 10–45)
ALT FLD-CCNC: 39 U/L — SIGNIFICANT CHANGE UP (ref 10–45)
ALT FLD-CCNC: 7 U/L — LOW (ref 10–45)
ANA PAT FLD IF-IMP: ABNORMAL
ANA TITR SER: ABNORMAL
ANION GAP SERPL CALC-SCNC: 11 MMOL/L — SIGNIFICANT CHANGE UP (ref 5–17)
ANION GAP SERPL CALC-SCNC: 8 MMOL/L — SIGNIFICANT CHANGE UP (ref 5–17)
ANION GAP SERPL CALC-SCNC: 9 MMOL/L — SIGNIFICANT CHANGE UP (ref 5–17)
ANION GAP SERPL CALC-SCNC: 9 MMOL/L — SIGNIFICANT CHANGE UP (ref 5–17)
ANISOCYTOSIS BLD QL: SLIGHT — SIGNIFICANT CHANGE UP
APTT BLD: 38 SEC — HIGH (ref 27.5–35.5)
AST SERPL-CCNC: 15 U/L — SIGNIFICANT CHANGE UP (ref 10–40)
AST SERPL-CCNC: 19 U/L — SIGNIFICANT CHANGE UP (ref 10–40)
AST SERPL-CCNC: 40 U/L — SIGNIFICANT CHANGE UP (ref 10–40)
AST SERPL-CCNC: 44 U/L — HIGH (ref 10–40)
BASOPHILS # BLD AUTO: 0 K/UL — SIGNIFICANT CHANGE UP (ref 0–0.2)
BASOPHILS # BLD AUTO: 0.01 K/UL — SIGNIFICANT CHANGE UP (ref 0–0.2)
BASOPHILS NFR BLD AUTO: 0 % — SIGNIFICANT CHANGE UP (ref 0–2)
BASOPHILS NFR BLD AUTO: 0.6 % — SIGNIFICANT CHANGE UP (ref 0–2)
BILIRUB SERPL-MCNC: 0.2 MG/DL — SIGNIFICANT CHANGE UP (ref 0.2–1.2)
BILIRUB SERPL-MCNC: 0.3 MG/DL — SIGNIFICANT CHANGE UP (ref 0.2–1.2)
BILIRUB SERPL-MCNC: 0.4 MG/DL — SIGNIFICANT CHANGE UP (ref 0.2–1.2)
BILIRUB SERPL-MCNC: 0.5 MG/DL — SIGNIFICANT CHANGE UP (ref 0.2–1.2)
BUN SERPL-MCNC: 16 MG/DL — SIGNIFICANT CHANGE UP (ref 7–23)
BUN SERPL-MCNC: 19 MG/DL — SIGNIFICANT CHANGE UP (ref 7–23)
BUN SERPL-MCNC: 26 MG/DL — HIGH (ref 7–23)
BUN SERPL-MCNC: 29 MG/DL — HIGH (ref 7–23)
C3 SERPL-MCNC: 124 MG/DL — SIGNIFICANT CHANGE UP (ref 81–157)
C4 SERPL-MCNC: 28 MG/DL — SIGNIFICANT CHANGE UP (ref 13–39)
CALCIUM SERPL-MCNC: 8.1 MG/DL — LOW (ref 8.4–10.5)
CALCIUM SERPL-MCNC: 8.2 MG/DL — LOW (ref 8.4–10.5)
CALCIUM SERPL-MCNC: 8.5 MG/DL — SIGNIFICANT CHANGE UP (ref 8.4–10.5)
CALCIUM SERPL-MCNC: 8.5 MG/DL — SIGNIFICANT CHANGE UP (ref 8.4–10.5)
CD3 BLASTS SPEC-ACNC: 242 /UL — LOW (ref 672–1870)
CD3 BLASTS SPEC-ACNC: 399 /UL — LOW (ref 672–1870)
CD3 BLASTS SPEC-ACNC: 444 /UL — LOW (ref 672–1870)
CD3 BLASTS SPEC-ACNC: 86 % — HIGH (ref 59–83)
CD3 BLASTS SPEC-ACNC: 87 % — HIGH (ref 59–83)
CD3 BLASTS SPEC-ACNC: 89 % — HIGH (ref 59–83)
CD4 %: 11 % — LOW (ref 30–62)
CD4 %: 12 % — LOW (ref 30–62)
CD4 %: 15 % — LOW (ref 30–62)
CD8 %: 67 % — HIGH (ref 12–36)
CD8 %: 73 % — HIGH (ref 12–36)
CD8 %: 74 % — HIGH (ref 12–36)
CHLORIDE SERPL-SCNC: 101 MMOL/L — SIGNIFICANT CHANGE UP (ref 96–108)
CHLORIDE SERPL-SCNC: 101 MMOL/L — SIGNIFICANT CHANGE UP (ref 96–108)
CHLORIDE SERPL-SCNC: 102 MMOL/L — SIGNIFICANT CHANGE UP (ref 96–108)
CHLORIDE SERPL-SCNC: 104 MMOL/L — SIGNIFICANT CHANGE UP (ref 96–108)
CO2 SERPL-SCNC: 22 MMOL/L — SIGNIFICANT CHANGE UP (ref 22–31)
CO2 SERPL-SCNC: 24 MMOL/L — SIGNIFICANT CHANGE UP (ref 22–31)
CO2 SERPL-SCNC: 25 MMOL/L — SIGNIFICANT CHANGE UP (ref 22–31)
CO2 SERPL-SCNC: 26 MMOL/L — SIGNIFICANT CHANGE UP (ref 22–31)
CREAT SERPL-MCNC: 1.15 MG/DL — SIGNIFICANT CHANGE UP (ref 0.5–1.3)
CREAT SERPL-MCNC: 1.19 MG/DL — SIGNIFICANT CHANGE UP (ref 0.5–1.3)
CREAT SERPL-MCNC: 1.52 MG/DL — HIGH (ref 0.5–1.3)
CREAT SERPL-MCNC: 1.64 MG/DL — HIGH (ref 0.5–1.3)
CRP SERPL-MCNC: 23.4 MG/L — HIGH (ref 0–4)
DACRYOCYTES BLD QL SMEAR: SLIGHT — SIGNIFICANT CHANGE UP
ELLIPTOCYTES BLD QL SMEAR: SLIGHT — SIGNIFICANT CHANGE UP
EOSINOPHIL # BLD AUTO: 0 K/UL — SIGNIFICANT CHANGE UP (ref 0–0.5)
EOSINOPHIL # BLD AUTO: 0.05 K/UL — SIGNIFICANT CHANGE UP (ref 0–0.5)
EOSINOPHIL # BLD AUTO: 0.08 K/UL — SIGNIFICANT CHANGE UP (ref 0–0.5)
EOSINOPHIL # BLD AUTO: 0.1 K/UL — SIGNIFICANT CHANGE UP (ref 0–0.5)
EOSINOPHIL NFR BLD AUTO: 0 % — SIGNIFICANT CHANGE UP (ref 0–6)
EOSINOPHIL NFR BLD AUTO: 2.8 % — SIGNIFICANT CHANGE UP (ref 0–6)
EOSINOPHIL NFR BLD AUTO: 3.8 % — SIGNIFICANT CHANGE UP (ref 0–6)
EOSINOPHIL NFR BLD AUTO: 5.3 % — SIGNIFICANT CHANGE UP (ref 0–6)
ERYTHROCYTE [SEDIMENTATION RATE] IN BLOOD: 48 MM/HR — HIGH
ESTIMATED AVERAGE GLUCOSE: 114 MG/DL — SIGNIFICANT CHANGE UP (ref 68–114)
FERRITIN SERPL-MCNC: 14 NG/ML — LOW (ref 15–150)
FERRITIN SERPL-MCNC: 15 NG/ML — SIGNIFICANT CHANGE UP (ref 15–150)
FERRITIN SERPL-MCNC: 28 NG/ML — SIGNIFICANT CHANGE UP (ref 15–150)
GGT SERPL-CCNC: 30 U/L — SIGNIFICANT CHANGE UP (ref 8–40)
GGT SERPL-CCNC: 41 U/L — HIGH (ref 8–40)
GIANT PLATELETS BLD QL SMEAR: PRESENT — SIGNIFICANT CHANGE UP
GIANT PLATELETS BLD QL SMEAR: PRESENT — SIGNIFICANT CHANGE UP
GLUCOSE BLDC GLUCOMTR-MCNC: 76 MG/DL — SIGNIFICANT CHANGE UP (ref 70–99)
GLUCOSE SERPL-MCNC: 104 MG/DL — HIGH (ref 70–99)
GLUCOSE SERPL-MCNC: 129 MG/DL — HIGH (ref 70–99)
GLUCOSE SERPL-MCNC: 80 MG/DL — SIGNIFICANT CHANGE UP (ref 70–99)
GLUCOSE SERPL-MCNC: 95 MG/DL — SIGNIFICANT CHANGE UP (ref 70–99)
HCT VFR BLD CALC: 26.5 % — LOW (ref 34.5–45)
HCT VFR BLD CALC: 27.4 % — LOW (ref 34.5–45)
HCT VFR BLD CALC: 29.1 % — LOW (ref 34.5–45)
HCT VFR BLD CALC: 33.4 % — LOW (ref 34.5–45)
HCV RNA SERPL NAA DL=5-ACNC: 30 IU/ML — HIGH
HCV RNA SERPL NAA DL=5-ACNC: SIGNIFICANT CHANGE UP IU/ML
HCV RNA SERPL NAA DL=5-ACNC: SIGNIFICANT CHANGE UP IU/ML
HCV RNA SPEC NAA+PROBE-LOG IU: 1.48 LOG10IU/ML — HIGH
HCV RNA SPEC NAA+PROBE-LOG IU: <1.08 LOG10IU/ML — HIGH
HCV RNA SPEC NAA+PROBE-LOG IU: SIGNIFICANT CHANGE UP LOG10IU/ML
HGB BLD-MCNC: 10 G/DL — LOW (ref 11.5–15.5)
HGB BLD-MCNC: 7.7 G/DL — LOW (ref 11.5–15.5)
HGB BLD-MCNC: 7.9 G/DL — LOW (ref 11.5–15.5)
HGB BLD-MCNC: 8.2 G/DL — LOW (ref 11.5–15.5)
HIV-1 VIRAL LOAD RESULT: ABNORMAL
HIV-1 VIRAL LOAD RESULT: ABNORMAL
HIV-1 VIRAL LOAD RESULT: SIGNIFICANT CHANGE UP
HIV1 RNA # SERPL NAA+PROBE: SIGNIFICANT CHANGE UP
HIV1 RNA SER-IMP: SIGNIFICANT CHANGE UP
HIV1 RNA SERPL NAA+PROBE-ACNC: ABNORMAL
HIV1 RNA SERPL NAA+PROBE-ACNC: ABNORMAL
HIV1 RNA SERPL NAA+PROBE-ACNC: SIGNIFICANT CHANGE UP
HIV1 RNA SERPL NAA+PROBE-LOG#: 4.05 — SIGNIFICANT CHANGE UP
HIV1 RNA SERPL NAA+PROBE-LOG#: ABNORMAL LG COP/ML
HIV1 RNA SERPL NAA+PROBE-LOG#: SIGNIFICANT CHANGE UP LG COP/ML
HYPOCHROMIA BLD QL: SLIGHT — SIGNIFICANT CHANGE UP
IMM GRANULOCYTES NFR BLD AUTO: 0.6 % — SIGNIFICANT CHANGE UP (ref 0–1.5)
INR BLD: 0.95 — SIGNIFICANT CHANGE UP (ref 0.88–1.16)
IRON SATN MFR SERPL: 24 UG/DL — LOW (ref 30–160)
IRON SATN MFR SERPL: 26 UG/DL — LOW (ref 30–160)
IRON SATN MFR SERPL: 33 UG/DL — SIGNIFICANT CHANGE UP (ref 30–160)
IRON SATN MFR SERPL: 5 % — LOW (ref 14–50)
IRON SATN MFR SERPL: 8 % — LOW (ref 14–50)
IRON SATN MFR SERPL: 9 % — LOW (ref 14–50)
LYMPHOCYTES # BLD AUTO: 0.26 K/UL — LOW (ref 1–3.3)
LYMPHOCYTES # BLD AUTO: 0.31 K/UL — LOW (ref 1–3.3)
LYMPHOCYTES # BLD AUTO: 0.38 K/UL — LOW (ref 1–3.3)
LYMPHOCYTES # BLD AUTO: 0.61 K/UL — LOW (ref 1–3.3)
LYMPHOCYTES # BLD AUTO: 13.3 % — SIGNIFICANT CHANGE UP (ref 13–44)
LYMPHOCYTES # BLD AUTO: 13.6 % — SIGNIFICANT CHANGE UP (ref 13–44)
LYMPHOCYTES # BLD AUTO: 17.2 % — SIGNIFICANT CHANGE UP (ref 13–44)
LYMPHOCYTES # BLD AUTO: 27.4 % — SIGNIFICANT CHANGE UP (ref 13–44)
MANUAL SMEAR VERIFICATION: SIGNIFICANT CHANGE UP
MCHC RBC-ENTMCNC: 20.5 PG — LOW (ref 27–34)
MCHC RBC-ENTMCNC: 20.6 PG — LOW (ref 27–34)
MCHC RBC-ENTMCNC: 20.7 PG — LOW (ref 27–34)
MCHC RBC-ENTMCNC: 23.5 PG — LOW (ref 27–34)
MCHC RBC-ENTMCNC: 28.2 GM/DL — LOW (ref 32–36)
MCHC RBC-ENTMCNC: 28.8 GM/DL — LOW (ref 32–36)
MCHC RBC-ENTMCNC: 29.1 GM/DL — LOW (ref 32–36)
MCHC RBC-ENTMCNC: 29.9 GM/DL — LOW (ref 32–36)
MCV RBC AUTO: 70.9 FL — LOW (ref 80–100)
MCV RBC AUTO: 71.7 FL — LOW (ref 80–100)
MCV RBC AUTO: 72.8 FL — LOW (ref 80–100)
MCV RBC AUTO: 78.4 FL — LOW (ref 80–100)
MICROCYTES BLD QL: SLIGHT — SIGNIFICANT CHANGE UP
MONOCYTES # BLD AUTO: 0.1 K/UL — SIGNIFICANT CHANGE UP (ref 0–0.9)
MONOCYTES # BLD AUTO: 0.15 K/UL — SIGNIFICANT CHANGE UP (ref 0–0.9)
MONOCYTES # BLD AUTO: 0.15 K/UL — SIGNIFICANT CHANGE UP (ref 0–0.9)
MONOCYTES # BLD AUTO: 0.19 K/UL — SIGNIFICANT CHANGE UP (ref 0–0.9)
MONOCYTES NFR BLD AUTO: 3.6 % — SIGNIFICANT CHANGE UP (ref 2–14)
MONOCYTES NFR BLD AUTO: 6.6 % — SIGNIFICANT CHANGE UP (ref 2–14)
MONOCYTES NFR BLD AUTO: 8.3 % — SIGNIFICANT CHANGE UP (ref 2–14)
MONOCYTES NFR BLD AUTO: 9.7 % — SIGNIFICANT CHANGE UP (ref 2–14)
NEUTROPHILS # BLD AUTO: 1.27 K/UL — LOW (ref 1.8–7.4)
NEUTROPHILS # BLD AUTO: 1.36 K/UL — LOW (ref 1.8–7.4)
NEUTROPHILS # BLD AUTO: 1.39 K/UL — LOW (ref 1.8–7.4)
NEUTROPHILS # BLD AUTO: 2.27 K/UL — SIGNIFICANT CHANGE UP (ref 1.8–7.4)
NEUTROPHILS NFR BLD AUTO: 61.3 % — SIGNIFICANT CHANGE UP (ref 43–77)
NEUTROPHILS NFR BLD AUTO: 67.3 % — SIGNIFICANT CHANGE UP (ref 43–77)
NEUTROPHILS NFR BLD AUTO: 70.5 % — SIGNIFICANT CHANGE UP (ref 43–77)
NEUTROPHILS NFR BLD AUTO: 77.3 % — HIGH (ref 43–77)
NEUTS BAND # BLD: 0.9 % — SIGNIFICANT CHANGE UP (ref 0–8)
NEUTS BAND # BLD: 3.5 % — SIGNIFICANT CHANGE UP (ref 0–8)
NEUTS BAND # BLD: 4.6 % — SIGNIFICANT CHANGE UP (ref 0–8)
NRBC # BLD: 0 /100 WBCS — SIGNIFICANT CHANGE UP (ref 0–0)
OVALOCYTES BLD QL SMEAR: SIGNIFICANT CHANGE UP
OVALOCYTES BLD QL SMEAR: SLIGHT — SIGNIFICANT CHANGE UP
OVALOCYTES BLD QL SMEAR: SLIGHT — SIGNIFICANT CHANGE UP
PLAT MORPH BLD: ABNORMAL
PLAT MORPH BLD: ABNORMAL
PLAT MORPH BLD: NORMAL — SIGNIFICANT CHANGE UP
PLATELET # BLD AUTO: 130 K/UL — LOW (ref 150–400)
PLATELET # BLD AUTO: 148 K/UL — LOW (ref 150–400)
PLATELET # BLD AUTO: 176 K/UL — SIGNIFICANT CHANGE UP (ref 150–400)
PLATELET # BLD AUTO: 183 K/UL — SIGNIFICANT CHANGE UP (ref 150–400)
POIKILOCYTOSIS BLD QL AUTO: SIGNIFICANT CHANGE UP
POIKILOCYTOSIS BLD QL AUTO: SIGNIFICANT CHANGE UP
POIKILOCYTOSIS BLD QL AUTO: SLIGHT — SIGNIFICANT CHANGE UP
POLYCHROMASIA BLD QL SMEAR: SLIGHT — SIGNIFICANT CHANGE UP
POTASSIUM SERPL-MCNC: 3.4 MMOL/L — LOW (ref 3.5–5.3)
POTASSIUM SERPL-MCNC: 3.7 MMOL/L — SIGNIFICANT CHANGE UP (ref 3.5–5.3)
POTASSIUM SERPL-MCNC: 4.1 MMOL/L — SIGNIFICANT CHANGE UP (ref 3.5–5.3)
POTASSIUM SERPL-MCNC: 4.5 MMOL/L — SIGNIFICANT CHANGE UP (ref 3.5–5.3)
POTASSIUM SERPL-SCNC: 3.4 MMOL/L — LOW (ref 3.5–5.3)
POTASSIUM SERPL-SCNC: 3.7 MMOL/L — SIGNIFICANT CHANGE UP (ref 3.5–5.3)
POTASSIUM SERPL-SCNC: 4.1 MMOL/L — SIGNIFICANT CHANGE UP (ref 3.5–5.3)
POTASSIUM SERPL-SCNC: 4.5 MMOL/L — SIGNIFICANT CHANGE UP (ref 3.5–5.3)
PROT SERPL-MCNC: 7.8 G/DL — SIGNIFICANT CHANGE UP (ref 6–8.3)
PROT SERPL-MCNC: 7.8 G/DL — SIGNIFICANT CHANGE UP (ref 6–8.3)
PROT SERPL-MCNC: 8.5 G/DL — HIGH (ref 6–8.3)
PROT SERPL-MCNC: 8.7 G/DL — HIGH (ref 6–8.3)
PROTHROM AB SERPL-ACNC: 11.4 SEC — SIGNIFICANT CHANGE UP (ref 10.6–13.6)
RBC # BLD: 3.74 M/UL — LOW (ref 3.8–5.2)
RBC # BLD: 3.82 M/UL — SIGNIFICANT CHANGE UP (ref 3.8–5.2)
RBC # BLD: 4 M/UL — SIGNIFICANT CHANGE UP (ref 3.8–5.2)
RBC # BLD: 4 M/UL — SIGNIFICANT CHANGE UP (ref 3.8–5.2)
RBC # BLD: 4.26 M/UL — SIGNIFICANT CHANGE UP (ref 3.8–5.2)
RBC # BLD: 4.26 M/UL — SIGNIFICANT CHANGE UP (ref 3.8–5.2)
RBC # FLD: 16.6 % — HIGH (ref 10.3–14.5)
RBC # FLD: 16.6 % — HIGH (ref 10.3–14.5)
RBC # FLD: 18.5 % — HIGH (ref 10.3–14.5)
RBC # FLD: 19.6 % — HIGH (ref 10.3–14.5)
RBC BLD AUTO: ABNORMAL
RETICS #: 47.3 K/UL — SIGNIFICANT CHANGE UP (ref 25–125)
RETICS #: 52.4 K/UL — SIGNIFICANT CHANGE UP (ref 25–125)
RETICS/RBC NFR: 1.1 % — SIGNIFICANT CHANGE UP (ref 0.5–2.5)
RETICS/RBC NFR: 1.3 % — SIGNIFICANT CHANGE UP (ref 0.5–2.5)
SARS-COV-2 N GENE NPH QL NAA+PROBE: NOT DETECTED
SARS-COV-2 N GENE NPH QL NAA+PROBE: NOT DETECTED
SMUDGE CELLS # BLD: PRESENT — SIGNIFICANT CHANGE UP
SODIUM SERPL-SCNC: 133 MMOL/L — LOW (ref 135–145)
SODIUM SERPL-SCNC: 135 MMOL/L — SIGNIFICANT CHANGE UP (ref 135–145)
SODIUM SERPL-SCNC: 136 MMOL/L — SIGNIFICANT CHANGE UP (ref 135–145)
SODIUM SERPL-SCNC: 138 MMOL/L — SIGNIFICANT CHANGE UP (ref 135–145)
SPHEROCYTES BLD QL SMEAR: SLIGHT — SIGNIFICANT CHANGE UP
SURGICAL PATHOLOGY STUDY: SIGNIFICANT CHANGE UP
T PALLIDUM AB TITR SER: NEGATIVE — SIGNIFICANT CHANGE UP
T-CELL CD4 SUBSET PNL BLD: 42 /UL — LOW (ref 489–1457)
T-CELL CD4 SUBSET PNL BLD: 52 /UL — LOW (ref 489–1457)
T-CELL CD4 SUBSET PNL BLD: 59 /UL — LOW (ref 489–1457)
TIBC SERPL-MCNC: 310 UG/DL — SIGNIFICANT CHANGE UP (ref 220–430)
TIBC SERPL-MCNC: 388 UG/DL — SIGNIFICANT CHANGE UP (ref 220–430)
TIBC SERPL-MCNC: 506 UG/DL — HIGH (ref 220–430)
TSH SERPL-MCNC: 2.66 UIU/ML — SIGNIFICANT CHANGE UP (ref 0.27–4.2)
UIBC SERPL-MCNC: 284 UG/DL — SIGNIFICANT CHANGE UP (ref 110–370)
UIBC SERPL-MCNC: 355 UG/DL — SIGNIFICANT CHANGE UP (ref 110–370)
UIBC SERPL-MCNC: 482 UG/DL — HIGH (ref 110–370)
VARIANT LYMPHS # BLD: 0.9 % — SIGNIFICANT CHANGE UP (ref 0–6)
VARIANT LYMPHS # BLD: 0.9 % — SIGNIFICANT CHANGE UP (ref 0–6)
WBC # BLD: 1.8 K/UL — LOW (ref 3.8–10.5)
WBC # BLD: 1.92 K/UL — LOW (ref 3.8–10.5)
WBC # BLD: 2.23 K/UL — LOW (ref 3.8–10.5)
WBC # BLD: 2.77 K/UL — LOW (ref 3.8–10.5)
WBC # FLD AUTO: 1.8 K/UL — LOW (ref 3.8–10.5)
WBC # FLD AUTO: 1.92 K/UL — LOW (ref 3.8–10.5)
WBC # FLD AUTO: 2.23 K/UL — LOW (ref 3.8–10.5)
WBC # FLD AUTO: 2.77 K/UL — LOW (ref 3.8–10.5)

## 2021-01-01 PROCEDURE — 43239 EGD BIOPSY SINGLE/MULTIPLE: CPT

## 2021-01-01 PROCEDURE — 94727 GAS DIL/WSHOT DETER LNG VOL: CPT

## 2021-01-01 PROCEDURE — 99072 ADDL SUPL MATRL&STAF TM PHE: CPT

## 2021-01-01 PROCEDURE — 88341 IMHCHEM/IMCYTCHM EA ADD ANTB: CPT

## 2021-01-01 PROCEDURE — 94060 EVALUATION OF WHEEZING: CPT

## 2021-01-01 PROCEDURE — 11104 PUNCH BX SKIN SINGLE LESION: CPT

## 2021-01-01 PROCEDURE — 99204 OFFICE O/P NEW MOD 45 MIN: CPT

## 2021-01-01 PROCEDURE — 88305 TISSUE EXAM BY PATHOLOGIST: CPT | Mod: 26

## 2021-01-01 PROCEDURE — 88305 TISSUE EXAM BY PATHOLOGIST: CPT

## 2021-01-01 PROCEDURE — 45380 COLONOSCOPY AND BIOPSY: CPT

## 2021-01-01 PROCEDURE — 99214 OFFICE O/P EST MOD 30 MIN: CPT | Mod: 25

## 2021-01-01 PROCEDURE — 99215 OFFICE O/P EST HI 40 MIN: CPT | Mod: GC

## 2021-01-01 PROCEDURE — 99213 OFFICE O/P EST LOW 20 MIN: CPT

## 2021-01-01 PROCEDURE — 78815 PET IMAGE W/CT SKULL-THIGH: CPT

## 2021-01-01 PROCEDURE — 71250 CT THORAX DX C-: CPT | Mod: 26

## 2021-01-01 PROCEDURE — A9552: CPT

## 2021-01-01 PROCEDURE — 43235 EGD DIAGNOSTIC BRUSH WASH: CPT

## 2021-01-01 PROCEDURE — 99215 OFFICE O/P EST HI 40 MIN: CPT

## 2021-01-01 PROCEDURE — 94729 DIFFUSING CAPACITY: CPT

## 2021-01-01 PROCEDURE — 99214 OFFICE O/P EST MOD 30 MIN: CPT

## 2021-01-01 PROCEDURE — 82962 GLUCOSE BLOOD TEST: CPT

## 2021-01-01 PROCEDURE — 99443: CPT

## 2021-01-01 PROCEDURE — 71046 X-RAY EXAM CHEST 2 VIEWS: CPT

## 2021-01-01 PROCEDURE — 88313 SPECIAL STAINS GROUP 2: CPT | Mod: 26

## 2021-01-01 PROCEDURE — 71046 X-RAY EXAM CHEST 2 VIEWS: CPT | Mod: 26

## 2021-01-01 PROCEDURE — 99204 OFFICE O/P NEW MOD 45 MIN: CPT | Mod: 25

## 2021-01-01 PROCEDURE — 78815 PET IMAGE W/CT SKULL-THIGH: CPT | Mod: 26

## 2021-01-01 PROCEDURE — 88313 SPECIAL STAINS GROUP 2: CPT

## 2021-01-01 PROCEDURE — 88342 IMHCHEM/IMCYTCHM 1ST ANTB: CPT | Mod: 26

## 2021-01-01 RX ORDER — KETOCONAZOLE 20 MG/G
2 CREAM TOPICAL
Qty: 1 | Refills: 3 | Status: ACTIVE | COMMUNITY
Start: 2021-01-01 | End: 1900-01-01

## 2021-01-01 RX ORDER — VALACYCLOVIR 1 G/1
1 TABLET, FILM COATED ORAL
Qty: 20 | Refills: 0 | Status: DISCONTINUED | COMMUNITY
Start: 2019-09-25 | End: 2021-01-01

## 2021-01-01 RX ORDER — NICOTINE 21 MG/24HR
21 PATCH, TRANSDERMAL 24 HOURS TRANSDERMAL DAILY
Qty: 1 | Refills: 1 | Status: DISCONTINUED | COMMUNITY
Start: 2017-07-10 | End: 2021-01-01

## 2021-01-01 RX ORDER — GLECAPREVIR AND PIBRENTASVIR 40; 100 MG/1; MG/1
100-40 TABLET, FILM COATED ORAL DAILY
Qty: 84 | Refills: 1 | Status: COMPLETED | COMMUNITY
Start: 2020-01-01 | End: 2021-01-01

## 2021-01-01 RX ORDER — CHLORHEXIDINE GLUCONATE 4 %
325 (65 FE) LIQUID (ML) TOPICAL
Qty: 30 | Refills: 2 | Status: ACTIVE | COMMUNITY
Start: 2021-01-01 | End: 1900-01-01

## 2021-01-01 RX ORDER — ERGOCALCIFEROL 1.25 MG/1
1.25 MG CAPSULE, LIQUID FILLED ORAL
Qty: 4 | Refills: 1 | Status: COMPLETED | COMMUNITY
Start: 2021-01-01 | End: 2021-01-01

## 2021-01-01 RX ORDER — OMEPRAZOLE 40 MG/1
40 CAPSULE, DELAYED RELEASE ORAL
Qty: 1 | Refills: 0 | Status: ACTIVE | COMMUNITY
Start: 2020-01-01 | End: 1900-01-01

## 2021-01-01 RX ORDER — TRIAMCINOLONE ACETONIDE 1 MG/G
0.1 CREAM TOPICAL
Qty: 1 | Refills: 0 | Status: ACTIVE | COMMUNITY
Start: 2021-01-01 | End: 1900-01-01

## 2021-01-01 RX ORDER — SULFAMETHOXAZOLE AND TRIMETHOPRIM 200; 40 MG/5ML; MG/5ML
200-40 SUSPENSION ORAL
Qty: 300 | Refills: 2 | Status: DISCONTINUED | COMMUNITY
Start: 2017-04-17 | End: 2021-01-01

## 2021-01-01 RX ORDER — FLUTICASONE FUROATE, UMECLIDINIUM BROMIDE AND VILANTEROL TRIFENATATE 100; 62.5; 25 UG/1; UG/1; UG/1
100-62.5-25 POWDER RESPIRATORY (INHALATION) DAILY
Qty: 1 | Refills: 11 | Status: ACTIVE | COMMUNITY
Start: 2021-01-01 | End: 1900-01-01

## 2021-01-01 RX ORDER — BICTEGRAVIR SODIUM, EMTRICITABINE, AND TENOFOVIR ALAFENAMIDE FUMARATE 50; 200; 25 MG/1; MG/1; MG/1
50-200-25 TABLET ORAL
Qty: 90 | Refills: 0 | Status: ACTIVE | COMMUNITY
Start: 2018-10-09 | End: 1900-01-01

## 2021-01-01 RX ORDER — FLUCONAZOLE 200 MG/1
200 TABLET ORAL
Qty: 22 | Refills: 0 | Status: DISCONTINUED | COMMUNITY
Start: 2021-01-01 | End: 2021-01-01

## 2021-01-01 RX ORDER — ALBUTEROL SULFATE 2.5 MG/3ML
(2.5 MG/3ML) SOLUTION RESPIRATORY (INHALATION)
Qty: 1 | Refills: 11 | Status: ACTIVE | COMMUNITY
Start: 2019-08-15

## 2021-01-01 RX ORDER — MUPIROCIN 20 MG/G
2 OINTMENT TOPICAL
Qty: 1 | Refills: 0 | Status: ACTIVE | COMMUNITY
Start: 2021-01-01 | End: 1900-01-01

## 2021-01-01 RX ORDER — ROSUVASTATIN CALCIUM 5 MG/1
5 TABLET, FILM COATED ORAL DAILY
Qty: 30 | Refills: 1 | Status: ACTIVE | COMMUNITY
Start: 2021-01-01 | End: 1900-01-01

## 2021-01-01 RX ORDER — TIOTROPIUM BROMIDE INHALATION SPRAY 3.12 UG/1
2.5 SPRAY, METERED RESPIRATORY (INHALATION) DAILY
Qty: 1 | Refills: 1 | Status: COMPLETED | COMMUNITY
Start: 2019-08-15 | End: 2021-01-01

## 2021-01-01 RX ORDER — MULTIVIT-MIN/FOLIC/VIT K/LYCOP 400-300MCG
25 MCG TABLET ORAL
Qty: 90 | Refills: 0 | Status: ACTIVE | COMMUNITY
Start: 2021-01-01 | End: 1900-01-01

## 2021-01-05 PROBLEM — R05 CHRONIC COUGH: Status: ACTIVE | Noted: 2021-01-01

## 2021-01-21 PROBLEM — R13.10 DYSPHAGIA, UNSPECIFIED TYPE: Status: ACTIVE | Noted: 2021-01-01

## 2021-01-21 NOTE — REVIEW OF SYSTEMS
[Recent Weight Loss (___ Lbs)] : recent [unfilled] ~Ulb weight loss [Abdominal Pain] : no abdominal pain [Vomiting] : no vomiting [Constipation] : no constipation [Diarrhea] : no diarrhea [Heartburn] : no heartburn [Melena] : no melena [Negative] : Heme/Lymph [FreeTextEntry7] : + dysphagia

## 2021-01-21 NOTE — PHYSICAL EXAM
[General Appearance - Alert] : alert [General Appearance - In No Acute Distress] : in no acute distress [General Appearance - Well Developed] : well developed [FreeTextEntry1] : very thin [Sclera] : the sclera and conjunctiva were normal [PERRL With Normal Accommodation] : pupils were equal in size, round, and reactive to light [Extraocular Movements] : extraocular movements were intact [Outer Ear] : the ears and nose were normal in appearance [Both Tympanic Membranes Were Examined] : both tympanic membranes were normal [Oropharynx] : the oropharynx was normal [Neck Appearance] : the appearance of the neck was normal [Neck Cervical Mass (___cm)] : no neck mass was observed [Thyroid Diffuse Enlargement] : the thyroid was not enlarged [Jugular Venous Distention Increased] : there was no jugular-venous distention [Thyroid Nodule] : there were no palpable thyroid nodules [Respiration, Rhythm And Depth] : normal respiratory rhythm and effort [Auscultation Breath Sounds / Voice Sounds] : lungs were clear to auscultation bilaterally [Heart Rate And Rhythm] : heart rate was normal and rhythm regular [Heart Sounds] : normal S1 and S2 [Heart Sounds Gallop] : no gallops [Murmurs] : no murmurs [Heart Sounds Pericardial Friction Rub] : no pericardial rub [Full Pulse] : the pedal pulses are present [Edema] : there was no peripheral edema [Abdomen Soft] : soft [Bowel Sounds] : normal bowel sounds [Abdomen Tenderness] : non-tender [Abdomen Mass (___ Cm)] : no abdominal mass palpated [Cervical Lymph Nodes Enlarged Posterior Bilaterally] : posterior cervical [Cervical Lymph Nodes Enlarged Anterior Bilaterally] : anterior cervical [No CVA Tenderness] : no ~M costovertebral angle tenderness [No Spinal Tenderness] : no spinal tenderness [Abnormal Walk] : normal gait [Nail Clubbing] : no clubbing  or cyanosis of the fingernails [Motor Tone] : muscle strength and tone were normal [Musculoskeletal - Swelling] : no joint swelling seen [Skin Color & Pigmentation] : normal skin color and pigmentation [Skin Turgor] : normal skin turgor [] : no rash [No Focal Deficits] : no focal deficits [Oriented To Time, Place, And Person] : oriented to person, place, and time [Impaired Insight] : insight and judgment were intact [Affect] : the affect was normal

## 2021-01-21 NOTE — HISTORY OF PRESENT ILLNESS
[de-identified] : 62 yo F PMH HIV (immune nonresponder, VL < 30/CD4 70 (15%) on Biktarvy, HCV on mavyret therapy, COPD with asthma overlap, seizure disorder on keppra (last seizure > 2 years ago), h/o substance use (cocaine, meth, benzo) in methadone program receiving methadone 60 mg daily, reports sobriety x mos, GERD< preDM, active smoker presents after referred for evaluation of GERD, dysphagia, and anemia. \par \par Patient reports difficulty swallowing, progressive trouble swallowing even liquids stating it feels like there is a block, reports occasional vomiting. \par \par Had to crush mavyret pils because of difficulty swallowing\par For the dysphagia she was given a trial of empiric fluconazole for possible candida. SHe is also being considered for reinitiation of bactrim if there is a candidal infection. \par \par Patient has also had weight loss and has a downtrending Hgb. Recent iron studies were normal aside from low % saturation. MCV 70.7. \par \par She is on omeprazole for the GERD\par \par Of note she does have a history of HCV as well, is being treated with mavyret. \par She also has a mildly elevated ALT to 44 (on recent labs), AST normal, AP elevated to 200s. \par She underwent an abdominal ultrasound that showed fatty liver and enlarged LN. She underwent a follow up abdominal MRI that showed ( ). She is being considered for hepatology referral. \par \par Patient reports GERD is a little better. Started omeprazole 3 weeks ago, but felt like it took 2 weeks \par \par Feels like there was a blockage in the throat, not even water would go down. \par \par The swallowing problem started 1.5-2 yrs ago. She didn't know what was going on. She discovered that it was GERD. They put her on the omeprazole 2 yrs ago, it took a while to go away then felt better. \par \par She can feel it start. She gets a feeling int he esophagus or throat. It feels like \par \par Started 1.5 mos ago. Can't eat because gets dysphagia. She has no heartburn symptoms. \par The most she ever weighed was 135 lbs 9 mos pregnant. \par \par PMH:\par HIV  \par HCV \par COPD with asthma overlap\par seizure disorder (last seizure 2 yrs ago, says nothing before giving up cocaine, stopped keppra) \par h/o substance abuse (cocaine, meth, bnezo)\par GERD\par preDM \par \par PSH: \par No prior surgeries\par  \par \par FH: \par maternal uncle with colorectal cancer\par \par SH: \par active smoker (smokes 1-2 cigarettes at night), cut down significantly\par No heavy ETOH \par No illicit drug use (prior drug use)\par \par MEDS: \par advair\par albuterol\par atorvastatin \par Biktarvy\par fluconazole \par mavyret (on 4 of 8 weeks) \par omeprazole \par spiriva\par bacterim \par valacyclovir\par vitamin D \par \par ALL: NKDA  numbness

## 2021-01-21 NOTE — ASSESSMENT
[FreeTextEntry1] : 60 yo F PMH HIV (immune nonresponder, VL < 30/CD4 70 (15%) on Biktarvy, HCV on mavyret therapy, COPD with asthma overlap, seizure disorder on keppra (last seizure > 2 years ago), h/o substance use (cocaine, meth, benzo) in methadone program receiving methadone 60 mg daily, reports sobriety x mos, GERD< preDM, active smoker presents after referred for evaluation of GERD, dysphagia, and anemia. \par \par Dysphagia\par - plan for EGD for further evaluation \par - continue PPI, patient reports feeling better on the PPI (had a similar prior episode in the past that resolved with PPI)\par - will obtain esophageal biopsies\par - if unrevealing may need barium esophagram and potentially HRM \par - can consider BRAVO testing in the future if above work up unrevealing\par \par Anemia and weight loss\par - plan for EGD/Colonoscopy and may need VCE in the future if aforementioned work up unrevealing\par - consider referral to hematology (given normal iron studies) for work up of microcytic anemia \par - has recent labs, she is seeing her PMD today, asked her to request CBC, CMP, ggt if getting labs done as well as an EKG. If does not get labs, told her she can get them done closer to the test date, when she is scheduled for the COVID PCR testing as well\par - spoke about the importance of smoking cessation as well as continued avoidance of drugs\par \par Of note she does have a history of HCV as well, is being treated with mavyret. \par She also has a mildly elevated ALT to 44 (on recent labs), AST normal, AP elevated to 200s. \par She underwent an abdominal ultrasound that showed fatty liver and enlarged LN. She underwent a follow up abdominal MRI that showed similar results. She is being considered for hepatology referral. \par consider GGT with next set of labs \par \par Follow up post-procedure

## 2021-03-31 PROBLEM — R06.02 SOB (SHORTNESS OF BREATH): Status: ACTIVE | Noted: 2021-01-01

## 2021-03-31 PROBLEM — R05 COUGH: Status: ACTIVE | Noted: 2018-10-09

## 2021-03-31 NOTE — ASSESSMENT
[FreeTextEntry1] : COPD\par \par She is currently on Advair BID and forgetting to us her Spiriva.  She is using the albuterol as needed.  She has not required hospital stay or steroids in the last yr.  Reviewed her CXR from 2/8/2021 Impression: Mild reticulonodular opacities in bilateral peripheral lungs. Possible trace right pleural effusion.\par \par Plan\par - CT scan follow up \par - PFT COVID swab 72 hours prior hold inhaler morning of PFT\par - change inhaler to [facility] pt provided return demonstration and aware to wash mouth out at this time.\par \par Tobacco use\par \par I discussed in details with the patient the health risk effects of tobacco smoking. The risks include, but not limited to COPD, cancer of head and neck/lung/stomach/ bladder, and coronary artery disease.  I discussed the options of smoke cessation with the patient. The options include nicotine replacement, pharmacological agents, and supportive tools. I recommended to start the nicotine patches and to handle the nicotine craving with the nicotine gum as needed. The initial dose of the nicotine patch depends of the amount and duration of tobacco consumptions.   She has the patch at home and going to start.\par \par SOB\par \par Discussed could be multifocal COPD, smoking, deconditioning, or cardiac.  Will further evaluate the CT scan and PFT.

## 2021-04-22 PROBLEM — J96.11 CHRONIC RESPIRATORY FAILURE WITH HYPOXIA: Status: ACTIVE | Noted: 2021-01-01

## 2021-04-22 NOTE — PHYSICAL EXAM
[No Acute Distress] : no acute distress [Normal Oropharynx] : normal oropharynx [Normal Appearance] : normal appearance [No Neck Mass] : no neck mass [Normal Rate/Rhythm] : normal rate/rhythm [Normal S1, S2] : normal s1, s2 [No Murmurs] : no murmurs [No Resp Distress] : no resp distress [Clear to Auscultation Bilaterally] : clear to auscultation bilaterally [No Abnormalities] : no abnormalities [Benign] : benign [Normal Gait] : normal gait [No Clubbing] : no clubbing [No Cyanosis] : no cyanosis [No Edema] : no edema [FROM] : FROM [Normal Color/ Pigmentation] : normal color/ pigmentation [No Focal Deficits] : no focal deficits [Oriented x3] : oriented x3 [Normal Affect] : normal affect [TextBox_68] : decreased air entry

## 2021-04-22 NOTE — PROCEDURE
[FreeTextEntry1] : Attempted 6MWT\par -- Baseline HR 92, baseline SpO2 86% pre-test on room air\par -- Patient walked 1 minute during the test and had to stop due to dyspnea, HR 98 and SpO2 82% (RA) which recovered to 94% on 2L nasal cannula.\par \par \par PFT 4/22/21\par FEV1 1.15 (45%), FVC 2.50 (75%), FEV1/FVC 46% TLC 80%, RV 85%, DLCO 22% \par No sig BD response by criteria but improved 10% and felt symptomatically better after BD challenge

## 2021-04-22 NOTE — ASSESSMENT
[FreeTextEntry1] : 62yo woman w/ HIV, COPD vs. ACOS, pulmonary nodules and suspicious lesion in LLL presenting for follow-up\par \par #COPD vs. asthma-COPD overlap (ACOS)\par She is doing well with the Trelegy and feels her symptoms are improved on it. Not in exacerbation clinically and has not required prednisone from UC or ED visits in recent past. She gets dyspneic with significant exertion but has been able to walk and do ADLs otherwise without limitation. \par - Continue on the Trelegy once a day\par - reminded her the importance of rinsing mouth after each use to avoid thrush\par - PFT in office today - severe OLD with very severely reduced DLCO\par - echo ordered to screen for pulmonary HTN and cardiac fxn\par \par #Pulmonary nodules and LLL lesion\par The patient was discussed at multidisciplinary tumor board last week. She is high risk for lung CA from her smoking. There is also significant risk of biopsying the LLL lesion given her severe emphysema for pneumothorax or other procedural complication. Consensus opinion of the tumor board was to first obtain PET/CT and if positive, will proceed with biopsy\par - PET/CT ordered today and tumor board recommendations were discussed with the patient who was in agreement with this plan\par \par #Tobacco use\par She is down to 1-2 cigarettes a day and trying to quit. Given her worries about lung lesion and nodule and seeing her emphysema on CT in office today she is going to increase her efforts to quit. Recommended trying patch and gum since has not tried combination NRT yet. \par \par #Chronic hypoxic resp failure\par - 6MWT stopped prematurely due to dyspnea and hypoxia- see procedure section for detail\par - home oxygen 2L ordered and concentrator\par \par Will follow on the PET/CT result and she will return to office to discuss next steps

## 2021-04-22 NOTE — REVIEW OF SYSTEMS
[Negative] : Endocrine [Cough] : cough [SOB on Exertion] : sob on exertion [Hemoptysis] : no hemoptysis [Chest Tightness] : no chest tightness [Frequent URIs] : no frequent URIs [Sputum] : no sputum [Dyspnea] : no dyspnea [Pleuritic Pain] : no pleuritic pain [Wheezing] : no wheezing [A.M. Dry Mouth] : no a.m. dry mouth [Chest Discomfort] : no chest discomfort [Claudication] : no claudication [Edema] : no edema [Orthopnea] : no orthopnea [Palpitations] : no palpitations [Syncope] : no syncope

## 2021-04-22 NOTE — HISTORY OF PRESENT ILLNESS
[Current] : current [Never] : never [TextBox_4] : she is following on the ct scan of chest.  She is still smoking 1-2 cig at night.  She is using the Trelegy once a day and she improved on it and feels she is breathing better.  Still, she can not rush around or she gets winded.

## 2021-05-16 NOTE — ASSESSMENT
[FreeTextEntry1] : Pulmonary nodules\par \par I discussed the PET scan in details with the patient.  The LLL is avid on PET scan but concern is the size.  There is uptake in the liver. There was no uptake mediastinal lymph notes.  I will discuss at the the tumor board and Dr Cornelius.\par \par COPD asthma\par \par CT scan revealed severe emphysematous changes.  Continue the bronchodilators for now.

## 2021-05-16 NOTE — REASON FOR VISIT
[Home] : at home, [unfilled] , at the time of the visit. [Medical Office: ([facility])___] : at the medical office located in  [Follow-Up] : a follow-up visit

## 2021-05-27 PROBLEM — R91.8 PULMONARY NODULES: Status: ACTIVE | Noted: 2021-01-01

## 2021-05-27 NOTE — HISTORY OF PRESENT ILLNESS
[TextBox_4] : She is doing very well.  She stopped smoking.  She improved on tetralogy.  Still have daily cough.  her to  discuss the PET scan

## 2021-05-27 NOTE — ASSESSMENT
[FreeTextEntry1] : COPD/Asthma\par \par I discussed the case in detail with the patient.  I reviewed the PFT and the CT scan with her in more details.  Is compliant with obstructive lung disease with significant.  The concern is the diffusion capacity was 22%.  That correlates with the severe emphysematous changes seen on the CT scan of chest.  She stopped smoking.  She improved on the triple therapy.  She is to continue on the current therapy.\par \par Pulmonary nodules.  I reviewed the case in details with the patient in regard the PET scan and the CT scan of the chest.  The finding on the cross-section is more consistent with fibrotic changes with traction bronchiectasis.  She is scheduled to repeat the CT scan and PFT.  CT scan is scheduled in November

## 2021-06-08 PROBLEM — R63.4 WEIGHT LOSS, UNINTENTIONAL: Status: ACTIVE | Noted: 2021-01-01

## 2021-06-08 PROBLEM — I77.6 VASCULITIS: Status: ACTIVE | Noted: 2021-01-01

## 2021-06-08 PROBLEM — K21.9 GERD (GASTROESOPHAGEAL REFLUX DISEASE): Status: ACTIVE | Noted: 2018-02-22

## 2021-06-08 PROBLEM — Z72.0 TOBACCO USE: Status: RESOLVED | Noted: 2019-08-15 | Resolved: 2021-01-01

## 2021-06-08 PROBLEM — F14.21 HISTORY OF COCAINE DEPENDENCE: Status: RESOLVED | Noted: 2019-08-15 | Resolved: 2021-01-01

## 2021-06-08 PROBLEM — J44.9 COPD (CHRONIC OBSTRUCTIVE PULMONARY DISEASE): Status: ACTIVE | Noted: 2017-07-10

## 2021-06-08 PROBLEM — R63.6 UNDERWEIGHT: Status: ACTIVE | Noted: 2021-01-01

## 2021-06-08 PROBLEM — Z91.89 10 YEAR RISK OF MI OR STROKE 7.5% OR GREATER: Status: ACTIVE | Noted: 2020-01-01

## 2021-06-08 PROBLEM — M25.541 ARTHRALGIA OF BOTH HANDS: Status: ACTIVE | Noted: 2018-04-17

## 2021-06-10 PROBLEM — D48.9 NEOPLASM OF UNCERTAIN BEHAVIOR: Status: ACTIVE | Noted: 2021-01-01

## 2021-06-10 PROBLEM — I87.8 VENOUS STASIS: Status: ACTIVE | Noted: 2021-01-01

## 2021-06-10 PROBLEM — L85.3 XEROSIS OF SKIN: Status: ACTIVE | Noted: 2021-01-01

## 2021-06-10 PROBLEM — B35.3 TINEA PEDIS OF BOTH FEET: Status: ACTIVE | Noted: 2021-01-01

## 2021-06-10 NOTE — PHYSICAL EXAM
[Alert] : alert [Oriented x 3] : ~L oriented x 3 [Well Nourished] : well nourished [Conjunctiva Non-injected] : conjunctiva non-injected [No Visual Lymphadenopathy] : no visual  lymphadenopathy [No Clubbing] : no clubbing [No Edema] : no edema [No Bromhidrosis] : no bromhidrosis [No Chromhidrosis] : no chromhidrosis [FreeTextEntry3] : scaling bilateral feet\par xerosis legs\par pitting edema BLE with purpuric patches, no palpable lesions

## 2021-06-10 NOTE — HISTORY OF PRESENT ILLNESS
[FreeTextEntry1] : ? vasculitis [de-identified] : 62 yo F hx HIV/AIDs, HCV on mavyret, and COPD here for above\par referred by Dr Cornelius for 5 day history of leg rash and discoloration\par not painful or itchy\par admits recent leg swelling \par no recent infections, medication changes other than new inhaler

## 2021-06-15 PROBLEM — D69.2 PURPURA: Status: ACTIVE | Noted: 2021-01-01

## 2021-06-15 PROBLEM — N17.9 AKI (ACUTE KIDNEY INJURY): Status: ACTIVE | Noted: 2021-01-01

## 2021-06-15 PROBLEM — B00.9 HSV INFECTION: Noted: 2020-01-01

## 2021-06-15 PROBLEM — B20 AIDS: Status: ACTIVE | Noted: 2017-11-29

## 2021-06-15 PROBLEM — D50.9 MICROCYTIC ANEMIA: Noted: 2021-01-01

## 2021-06-15 PROBLEM — E55.9 VITAMIN D DEFICIENCY: Status: ACTIVE | Noted: 2021-01-01

## 2021-06-15 PROBLEM — R16.2 HEPATOSPLENOMEGALY: Status: ACTIVE | Noted: 2021-01-01

## 2021-06-15 PROBLEM — D50.0 IRON DEFICIENCY ANEMIA DUE TO CHRONIC BLOOD LOSS: Status: ACTIVE | Noted: 2021-01-01

## 2021-06-15 PROBLEM — R21 RASH: Status: ACTIVE | Noted: 2020-01-01

## 2021-06-18 PROBLEM — K74.60 CIRRHOSIS: Status: ACTIVE | Noted: 2021-01-01

## 2021-07-06 ENCOUNTER — NON-APPOINTMENT (OUTPATIENT)
Age: 61
End: 2021-07-06

## 2021-07-14 ENCOUNTER — APPOINTMENT (OUTPATIENT)
Dept: GASTROENTEROLOGY | Facility: CLINIC | Age: 61
End: 2021-07-14

## 2021-07-20 ENCOUNTER — NON-APPOINTMENT (OUTPATIENT)
Age: 61
End: 2021-07-20

## 2021-07-22 ENCOUNTER — APPOINTMENT (OUTPATIENT)
Dept: DERMATOLOGY | Facility: CLINIC | Age: 61
End: 2021-07-22

## 2021-08-30 ENCOUNTER — APPOINTMENT (OUTPATIENT)
Dept: PULMONOLOGY | Facility: CLINIC | Age: 61
End: 2021-08-30

## 2022-04-11 PROBLEM — Z11.59 SCREENING FOR VIRAL DISEASE: Status: ACTIVE | Noted: 2021-01-01

## 2025-02-04 NOTE — ED ADULT TRIAGE NOTE - AS HEIGHT TYPE
Quality 226: Preventive Care And Screening: Tobacco Use: Screening And Cessation Intervention: Patient screened for tobacco use and is an ex/non-smoker
Detail Level: Detailed
stated

## 2025-07-07 NOTE — ED PROVIDER NOTE - [email or web address]_ED_A_ED
Sepsis likely 2/2 pna Sepsis likely 2/2 pna Sepsis likely 2/2 pna Sepsis likely 2/2 pna Sepsis likely 2/2 pna Sepsis likely 2/2 pna Sepsis likely 2/2 pna Sepsis likely 2/2 pna Sepsis likely 2/2 pna Sepsis likely 2/2 pna Sepsis likely 2/2 pna Sepsis likely 2/2 pna Sepsis likely 2/2 pna Sepsis likely 2/2 pna Sepsis likely 2/2 pna Sepsis likely 2/2 pna Sepsis likely 2/2 pna Sepsis likely 2/2 pna Sepsis likely 2/2 pna Sepsis likely 2/2 pna Sepsis likely 2/2 pna Sepsis likely 2/2 pna Sepsis likely 2/2 pna Sepsis likely 2/2 pna Sepsis likely 2/2 pna Sepsis likely 2/2 pna Sepsis likely 2/2 pna Sepsis likely 2/2 pna Sepsis likely 2/2 pna Sepsis likely 2/2 pna Sepsis likely 2/2 pna Sepsis likely 2/2 pna Sepsis likely 2/2 pna Sepsis likely 2/2 pna Sepsis likely 2/2 pna Sepsis likely 2/2 pna Sepsis likely 2/2 pna Sepsis likely 2/2 pna Sepsis likely 2/2 pna Sepsis likely 2/2 pna Attending Attestation (For Attendings USE Only)... Sepsis likely 2/2 pna

## 2025-07-10 NOTE — PATIENT PROFILE ADULT. - [id number]_[id number]_CORE_SD
Problem: Adult Inpatient Plan of Care  Goal: Plan of Care Review  Outcome: Progressing  Goal: Patient-Specific Goal (Individualized)  Outcome: Progressing  Goal: Absence of Hospital-Acquired Illness or Injury  Outcome: Progressing  Goal: Optimal Comfort and Wellbeing  Outcome: Progressing  Goal: Readiness for Transition of Care  Outcome: Progressing     Problem: Acute Kidney Injury/Impairment  Goal: Fluid and Electrolyte Balance  Outcome: Progressing  Goal: Improved Oral Intake  Outcome: Progressing  Goal: Effective Renal Function  Outcome: Progressing     Problem: Infection  Goal: Absence of Infection Signs and Symptoms  Outcome: Progressing     Problem: Wound  Goal: Optimal Coping  Outcome: Progressing  Goal: Optimal Functional Ability  Outcome: Progressing  Goal: Absence of Infection Signs and Symptoms  Outcome: Progressing  Goal: Improved Oral Intake  Outcome: Progressing  Goal: Optimal Pain Control and Function  Outcome: Progressing  Goal: Skin Health and Integrity  Outcome: Progressing  Goal: Optimal Wound Healing  Outcome: Progressing     Problem: Skin Injury Risk Increased  Goal: Skin Health and Integrity  Outcome: Progressing     Problem: Noninvasive Ventilation Acute  Goal: Effective Unassisted Ventilation and Oxygenation  Outcome: Progressing     Problem: Fall Injury Risk  Goal: Absence of Fall and Fall-Related Injury  Outcome: Progressing     Problem: Restraint, Nonviolent  Goal: Absence of Harm or Injury  Outcome: Progressing      no